# Patient Record
Sex: FEMALE | Race: WHITE | Employment: OTHER | URBAN - METROPOLITAN AREA
[De-identification: names, ages, dates, MRNs, and addresses within clinical notes are randomized per-mention and may not be internally consistent; named-entity substitution may affect disease eponyms.]

---

## 2017-01-19 ENCOUNTER — HOSPITAL ENCOUNTER (OUTPATIENT)
Dept: LAB | Age: 67
Discharge: HOME | End: 2017-01-19
Payer: MEDICARE

## 2017-01-19 LAB
BASOPHILS # BLD AUTO: 0 K/UL (ref 0–0.1)
BASOPHILS # BLD: 0 % (ref 0–2)
DIFFERENTIAL METHOD BLD: NORMAL
EOSINOPHIL # BLD: 0.1 K/UL (ref 0–0.4)
EOSINOPHIL NFR BLD: 2 % (ref 0–5)
ERYTHROCYTE [DISTWIDTH] IN BLOOD BY AUTOMATED COUNT: 13.5 % (ref 11.6–14.5)
FERRITIN SERPL-MCNC: 51 NG/ML (ref 8–388)
HCT VFR BLD AUTO: 41.5 % (ref 35–45)
HGB BLD-MCNC: 13.2 G/DL (ref 12–16)
IRON SATN MFR SERPL: 20 %
IRON SERPL-MCNC: 77 UG/DL (ref 50–175)
LYMPHOCYTES # BLD AUTO: 41 % (ref 21–52)
LYMPHOCYTES # BLD: 2.2 K/UL (ref 0.9–3.6)
MCH RBC QN AUTO: 25.7 PG (ref 24–34)
MCHC RBC AUTO-ENTMCNC: 31.8 G/DL (ref 31–37)
MCV RBC AUTO: 80.9 FL (ref 74–97)
MONOCYTES # BLD: 0.4 K/UL (ref 0.05–1.2)
MONOCYTES NFR BLD AUTO: 7 % (ref 3–10)
NEUTS SEG # BLD: 2.6 K/UL (ref 1.8–8)
NEUTS SEG NFR BLD AUTO: 50 % (ref 40–73)
PLATELET # BLD AUTO: 237 K/UL (ref 135–420)
PMV BLD AUTO: 11.1 FL (ref 9.2–11.8)
RBC # BLD AUTO: 5.13 M/UL (ref 4.2–5.3)
TIBC SERPL-MCNC: 382 UG/DL (ref 250–450)
WBC # BLD AUTO: 5.3 K/UL (ref 4.6–13.2)

## 2017-01-19 PROCEDURE — 36415 COLL VENOUS BLD VENIPUNCTURE: CPT | Performed by: INTERNAL MEDICINE

## 2017-01-19 PROCEDURE — 82728 ASSAY OF FERRITIN: CPT | Performed by: INTERNAL MEDICINE

## 2017-01-19 PROCEDURE — 83540 ASSAY OF IRON: CPT | Performed by: INTERNAL MEDICINE

## 2017-01-19 PROCEDURE — 85025 COMPLETE CBC W/AUTO DIFF WBC: CPT | Performed by: INTERNAL MEDICINE

## 2017-02-03 ENCOUNTER — OFFICE VISIT (OUTPATIENT)
Dept: INTERNAL MEDICINE CLINIC | Age: 67
End: 2017-02-03

## 2017-02-03 VITALS
WEIGHT: 121 LBS | BODY MASS INDEX: 24.39 KG/M2 | SYSTOLIC BLOOD PRESSURE: 120 MMHG | TEMPERATURE: 98.8 F | DIASTOLIC BLOOD PRESSURE: 80 MMHG | RESPIRATION RATE: 16 BRPM | OXYGEN SATURATION: 100 % | HEIGHT: 59 IN | HEART RATE: 105 BPM

## 2017-02-03 DIAGNOSIS — J45.20 MILD INTERMITTENT ASTHMA WITHOUT COMPLICATION: ICD-10-CM

## 2017-02-03 DIAGNOSIS — J06.9 URI, ACUTE: Primary | ICD-10-CM

## 2017-02-03 RX ORDER — AMOXICILLIN AND CLAVULANATE POTASSIUM 875; 125 MG/1; MG/1
TABLET, FILM COATED ORAL
Qty: 14 TAB | Refills: 0 | Status: SHIPPED | OUTPATIENT
Start: 2017-02-03 | End: 2017-02-24 | Stop reason: ALTCHOICE

## 2017-02-03 RX ORDER — BENZONATATE 200 MG/1
200 CAPSULE ORAL
Qty: 20 CAP | Refills: 0 | Status: SHIPPED | OUTPATIENT
Start: 2017-02-03 | End: 2017-02-10

## 2017-02-03 RX ORDER — AMOXICILLIN 500 MG/1
CAPSULE ORAL
Qty: 30 CAP | Refills: 0 | Status: SHIPPED | OUTPATIENT
Start: 2017-02-03 | End: 2017-02-04 | Stop reason: ALTCHOICE

## 2017-02-03 NOTE — PATIENT INSTRUCTIONS
Health Maintenance Due   Topic Date Due    Hepatitis C Test  1950    Breast Cancer Screening  08/01/2010    Shingles Vaccine  01/12/2011    Glaucoma Screening   01/12/2016    Bone Density Screening  01/12/2016    Pneumococcal Vaccine (1 of 2 - PCV13) 01/12/2016    Annual Well Visit  01/12/2016    DTaP/Tdap/Td  (1 - Tdap) 07/06/2016

## 2017-02-03 NOTE — PROGRESS NOTES
1. Have you been to the ER, urgent care clinic since your last visit? Hospitalized since your last visit? No    2. Have you seen or consulted any other health care providers outside of the [address] [place] [address] since your last visit? Include any pap smears or colon screening.  Dr. Hahn Delay - Colonoscopy scheduled for March - HV to do blood work

## 2017-02-04 NOTE — PROGRESS NOTES
The patient presents to the office today with the chief complaint of cough    HPI    The patient complains of sinus congestion with drainage, chest congestion and a cough. The symptoms started 5 days ago. The patient has mld asthma. The is some wheezing with this infection but not severe      Review of Systems   HENT: Positive for congestion. Respiratory: Positive for cough and shortness of breath (Very mild). Cardiovascular: Negative for chest pain and leg swelling. No Known Allergies    Current Outpatient Prescriptions   Medication Sig Dispense Refill    benzonatate (TESSALON) 200 mg capsule Take 1 Cap by mouth three (3) times daily as needed for Cough for up to 7 days. 20 Cap 0    amoxicillin-clavulanate (AUGMENTIN) 875-125 mg per tablet 1 tablet twice per day with food 14 Tab 0    fluticasone (FLONASE) 50 mcg/actuation nasal spray INSTILL 2 SPAYS INTO BOTH NOSTRILS NIGHTLY 1 Bottle 4    FLOVENT HFA 44 mcg/actuation inhaler INHALE 2 PUFFS BY MOUTH 2 TIMES DAILY 10 Inhaler 0    montelukast (SINGULAIR) 10 mg tablet TAKE 1 TABLET BY MOUTH DAILY. 90 Tab 1    meclizine (ANTIVERT) 12.5 mg tablet 1 tablet three times per day 40 Tab 1    levomilnacipran (FETZIMA) 20 mg capsule Take 20 mg by mouth daily.  DESIPRAMINE HCL (NORPRAMIN PO) Take 20 mg by mouth.  loratadine (CLARITIN) 10 mg tablet Take 10 mg by mouth daily.  escitalopram (LEXAPRO) 10 mg tablet Take 5 mg by mouth nightly.  clonazePAM (KLONOPIN) 0.5 mg tablet Take 0.5 mg by mouth two (2) times a day.          Past Medical History   Diagnosis Date    Acute bronchitis     Acute upper respiratory infection     Asthma     Cancer ([facility] [address]     Headache     Low back pain     Other ill-defined conditions(799.89)      cataracts    Psychiatric disorder      anxiety issues    Vision decreased      lens implants from cataracts       Past Surgical History   Procedure Laterality Date    Hx heent       cataract    Pr breast surgery procedure unlisted Bilateral 9/2008     mastectomy with reconstruction    Hx knee arthroscopy Bilateral     Hx tonsillectomy      Hx other surgical       sinus surgery    Hx colonoscopy  12/2012     neg       Social History     Social History    Marital status:      Spouse name: N/A    Number of children: N/A    Years of education: N/A     Occupational History    Not on file. Social History Main Topics    Smoking status: Never Smoker    Smokeless tobacco: Never Used    Alcohol use No    Drug use: No    Sexual activity: Not on file     Other Topics Concern    Not on file     Social History Narrative       Patient does not have an advanced directive on file    Visit Vitals    /80 (BP 1 Location: Left arm, BP Patient Position: Sitting)    Pulse (!) 105    Temp 98.8 °F (37.1 °C) (Tympanic)    Resp 16    Ht 4' 11\" (1.499 m)    Wt 121 lb (54.9 kg)    SpO2 100%    BMI 24.44 kg/m2       Physical Exam   HENT:   Mouth/Throat: No oropharyngeal exudate. Ears:  Mild erythema right TM. Normal on the left   Neck: Carotid bruit is not present. No thyromegaly present. Cardiovascular: Normal rate. Exam reveals no gallop. No murmur heard. Pulmonary/Chest: She has no wheezes. She has rales (Scattered rhonch). Abdominal: Soft. She exhibits no distension. There is no splenomegaly or hepatomegaly. There is no tenderness. Musculoskeletal: She exhibits no edema. Lymphadenopathy:     She has no cervical adenopathy. Right: No supraclavicular adenopathy present. Left: No supraclavicular adenopathy present.        Hospital Outpatient Visit on 01/19/2017   Component Date Value Ref Range Status    WBC 01/19/2017 5.3  4.6 - 13.2 K/uL Final    RBC 01/19/2017 5.13  4.20 - 5.30 M/uL Final    HGB 01/19/2017 13.2  12.0 - 16.0 g/dL Final    HCT 01/19/2017 41.5  35.0 - 45.0 % Final    MCV 01/19/2017 80.9  74.0 - 97.0 FL Final    MCH 01/19/2017 25.7  24.0 - 34.0 PG Final  MCHC 01/19/2017 31.8  31.0 - 37.0 g/dL Final    RDW 01/19/2017 13.5  11.6 - 14.5 % Final    PLATELET 74/72/2649 053  135 - 420 K/uL Final    MPV 01/19/2017 11.1  9.2 - 11.8 FL Final    NEUTROPHILS 01/19/2017 50  40 - 73 % Final    LYMPHOCYTES 01/19/2017 41  21 - 52 % Final    MONOCYTES 01/19/2017 7  3 - 10 % Final    EOSINOPHILS 01/19/2017 2  0 - 5 % Final    BASOPHILS 01/19/2017 0  0 - 2 % Final    ABS. NEUTROPHILS 01/19/2017 2.6  1.8 - 8.0 K/UL Final    ABS. LYMPHOCYTES 01/19/2017 2.2  0.9 - 3.6 K/UL Final    ABS. MONOCYTES 01/19/2017 0.4  0.05 - 1.2 K/UL Final    ABS. EOSINOPHILS 01/19/2017 0.1  0.0 - 0.4 K/UL Final    ABS. BASOPHILS 01/19/2017 0.0  0.0 - 0.1 K/UL Final    DF 01/19/2017 AUTOMATED    Final    Ferritin 01/19/2017 51  8 - 388 NG/ML Final    Iron 01/19/2017 77  50 - 175 ug/dL Final    TIBC 01/19/2017 382  250 - 450 ug/dL Final    Iron % saturation 01/19/2017 20  % Final       .No results found for any visits on 02/03/17. Assessment / Plan      ICD-10-CM ICD-9-CM    1. URI, acute J06.9 465.9    2. Mild intermittent asthma without complication D20.35 778.99      Augmentin  Tessalon Perles  she was advised to continue her maintenance medications  she is to call if symptoms persist over four days    Follow-up Disposition:  Return in about 4 months (around 6/3/2017). I asked Jaylyn Carey if she has any questions and I answered the questions.   Jaylyn Carey states that she understands the treatment plan and agrees with the treatment plan

## 2017-02-07 ENCOUNTER — OFFICE VISIT (OUTPATIENT)
Dept: INTERNAL MEDICINE CLINIC | Age: 67
End: 2017-02-07

## 2017-02-07 VITALS
TEMPERATURE: 98.7 F | BODY MASS INDEX: 24.39 KG/M2 | SYSTOLIC BLOOD PRESSURE: 130 MMHG | WEIGHT: 121 LBS | RESPIRATION RATE: 16 BRPM | HEART RATE: 75 BPM | OXYGEN SATURATION: 100 % | HEIGHT: 59 IN | DIASTOLIC BLOOD PRESSURE: 88 MMHG

## 2017-02-07 DIAGNOSIS — J40 BRONCHITIS: Primary | ICD-10-CM

## 2017-02-07 DIAGNOSIS — J45.21 MILD INTERMITTENT ASTHMA WITH ACUTE EXACERBATION: ICD-10-CM

## 2017-02-07 RX ORDER — MONTELUKAST SODIUM 10 MG/1
TABLET ORAL
Qty: 30 TAB | Refills: 5 | Status: SHIPPED | OUTPATIENT
Start: 2017-02-07 | End: 2017-04-07 | Stop reason: SDUPTHER

## 2017-02-07 RX ORDER — PREDNISONE 10 MG/1
TABLET ORAL
Qty: 20 TAB | Refills: 0 | Status: SHIPPED | OUTPATIENT
Start: 2017-02-07 | End: 2017-02-24 | Stop reason: ALTCHOICE

## 2017-02-07 RX ORDER — CLARITHROMYCIN 500 MG/1
TABLET, FILM COATED ORAL
Qty: 20 TAB | Refills: 0 | Status: SHIPPED | OUTPATIENT
Start: 2017-02-07 | End: 2017-02-17 | Stop reason: SDUPTHER

## 2017-02-07 NOTE — PROGRESS NOTES
1. Have you been to the ER, urgent care clinic since your last visit? Hospitalized since your last visit? No    2. Have you seen or consulted any other health care providers outside of the [address] [place] [address] since your last visit? Include any pap smears or colon screening.  No

## 2017-02-07 NOTE — PROGRESS NOTES
The patient presents to the office today with the chief complaint of chest congestion    HPI    The patient complains of chest congestion with cough. The cough is minimally productive of yellowish sputum. The patient denies fever. The patient has mild dyspnea. The patient recently had been on antibiotics with little improvement. Review of Systems   Respiratory: Positive for sputum production (Small amount of sputum) and shortness of breath (Mild dyspnea). Cardiovascular: Negative for chest pain and leg swelling. No Known Allergies    Current Outpatient Prescriptions   Medication Sig Dispense Refill    clarithromycin (BIAXIN) 500 mg tablet 1 tablet twice per day with food (Stop Augmentin) 20 Tab 0    predniSONE (DELTASONE) 10 mg tablet 3 tabs daily x 3 days, 2 tabs daily x 3 days, 1 tab daily x 3 days, 1/2 tab daily x 3 days 20 Tab 0    montelukast (SINGULAIR) 10 mg tablet 1 tablet daily 30 Tab 5    benzonatate (TESSALON) 200 mg capsule Take 1 Cap by mouth three (3) times daily as needed for Cough for up to 7 days. 20 Cap 0    amoxicillin-clavulanate (AUGMENTIN) 875-125 mg per tablet 1 tablet twice per day with food 14 Tab 0    fluticasone (FLONASE) 50 mcg/actuation nasal spray INSTILL 2 SPAYS INTO BOTH NOSTRILS NIGHTLY 1 Bottle 4    FLOVENT HFA 44 mcg/actuation inhaler INHALE 2 PUFFS BY MOUTH 2 TIMES DAILY 10 Inhaler 0    meclizine (ANTIVERT) 12.5 mg tablet 1 tablet three times per day 40 Tab 1    levomilnacipran (FETZIMA) 20 mg capsule Take 20 mg by mouth daily.  DESIPRAMINE HCL (NORPRAMIN PO) Take 20 mg by mouth.  loratadine (CLARITIN) 10 mg tablet Take 10 mg by mouth daily.  escitalopram (LEXAPRO) 10 mg tablet Take 5 mg by mouth nightly.  clonazePAM (KLONOPIN) 0.5 mg tablet Take 0.5 mg by mouth two (2) times a day.          Past Medical History   Diagnosis Date    Acute bronchitis     Acute upper respiratory infection     Asthma     Cancer ([facility] [address]     Headache     Low back pain     Other ill-defined conditions(799.89)      cataracts    Psychiatric disorder      anxiety issues    Vision decreased      lens implants from cataracts       Past Surgical History   Procedure Laterality Date    Hx heent       cataract    Pr breast surgery procedure unlisted Bilateral 9/2008     mastectomy with reconstruction    Hx knee arthroscopy Bilateral     Hx tonsillectomy      Hx other surgical       sinus surgery    Hx colonoscopy  12/2012     neg       Social History     Social History    Marital status:      Spouse name: N/A    Number of children: N/A    Years of education: N/A     Occupational History    Not on file. Social History Main Topics    Smoking status: Never Smoker    Smokeless tobacco: Never Used    Alcohol use No    Drug use: No    Sexual activity: Not Currently     Other Topics Concern    Not on file     Social History Narrative       Patient does not have an advanced directive on file    Visit Vitals    /88 (BP 1 Location: Left arm, BP Patient Position: Sitting)    Pulse 75    Temp 98.7 °F (37.1 °C) (Tympanic)    Resp 16    Ht 4' 11\" (1.499 m)    Wt 121 lb (54.9 kg)    SpO2 100%    BMI 24.44 kg/m2       Physical Exam   HENT:   Mouth/Throat: No oropharyngeal exudate. Neck: Carotid bruit is not present. Cardiovascular: Normal rate and regular rhythm. Exam reveals no gallop. No murmur heard. Pulmonary/Chest: She has no wheezes. She has rales (Scattered rhonchi with prolonged expiratory phase).      BMI:  [facility] Outpatient Visit on 01/19/2017   Component Date Value Ref Range Status    WBC 01/19/2017 5.3  4.6 - 13.2 K/uL Final    RBC 01/19/2017 5.13  4.20 - 5.30 M/uL Final    HGB 01/19/2017 13.2  12.0 - 16.0 g/dL Final    HCT 01/19/2017 41.5  35.0 - 45.0 % Final    MCV 01/19/2017 80.9  74.0 - 97.0 FL Final    MCH 01/19/2017 25.7  24.0 - 34.0 PG Final    MCHC 01/19/2017 31.8  31.0 - 37.0 g/dL Final    RDW 01/19/2017 13.5  11.6 - 14.5 % Final    PLATELET 00/02/3927 803  135 - 420 K/uL Final    MPV 01/19/2017 11.1  9.2 - 11.8 FL Final    NEUTROPHILS 01/19/2017 50  40 - 73 % Final    LYMPHOCYTES 01/19/2017 41  21 - 52 % Final    MONOCYTES 01/19/2017 7  3 - 10 % Final    EOSINOPHILS 01/19/2017 2  0 - 5 % Final    BASOPHILS 01/19/2017 0  0 - 2 % Final    ABS. NEUTROPHILS 01/19/2017 2.6  1.8 - 8.0 K/UL Final    ABS. LYMPHOCYTES 01/19/2017 2.2  0.9 - 3.6 K/UL Final    ABS. MONOCYTES 01/19/2017 0.4  0.05 - 1.2 K/UL Final    ABS. EOSINOPHILS 01/19/2017 0.1  0.0 - 0.4 K/UL Final    ABS. BASOPHILS 01/19/2017 0.0  0.0 - 0.1 K/UL Final    DF 01/19/2017 AUTOMATED    Final    Ferritin 01/19/2017 51  8 - 388 NG/ML Final    Iron 01/19/2017 77  50 - 175 ug/dL Final    TIBC 01/19/2017 382  250 - 450 ug/dL Final    Iron % saturation 01/19/2017 20  % Final       .No results found for any visits on 02/07/17. Assessment / Plan      ICD-10-CM ICD-9-CM    1. Bronchitis J40 490    2. Mild intermittent asthma with acute exacerbation J45.21 493.92      Biaxin  Prednisone  she was advised to continue her maintenance medications  she is to call if symptoms persist over five days    Follow-up Disposition:  Return in about 4 months (around 6/7/2017). I asked Radha Santana if she has any questions and I answered the questions.   Radha Santana states that she understands the treatment plan and agrees with the treatment plan

## 2017-02-17 RX ORDER — CLARITHROMYCIN 500 MG/1
TABLET, FILM COATED ORAL
Qty: 20 TAB | Refills: 0 | Status: SHIPPED | OUTPATIENT
Start: 2017-02-17 | End: 2017-05-08 | Stop reason: ALTCHOICE

## 2017-02-17 NOTE — TELEPHONE ENCOUNTER
Patient states the Biaxin Dr. Ivana Caro gave her has helped a lot, she took her last one this morning. She feels like she needs another prescription to help her get completely well. She is still taking the prednisone, tomorrow will be the last day of prednisone.

## 2017-02-24 ENCOUNTER — OFFICE VISIT (OUTPATIENT)
Dept: INTERNAL MEDICINE CLINIC | Age: 67
End: 2017-02-24

## 2017-02-24 VITALS
RESPIRATION RATE: 16 BRPM | OXYGEN SATURATION: 98 % | BODY MASS INDEX: 24.39 KG/M2 | WEIGHT: 121 LBS | TEMPERATURE: 99.9 F | SYSTOLIC BLOOD PRESSURE: 128 MMHG | HEART RATE: 100 BPM | DIASTOLIC BLOOD PRESSURE: 82 MMHG | HEIGHT: 59 IN

## 2017-02-24 DIAGNOSIS — J06.9 ACUTE URI: Primary | ICD-10-CM

## 2017-02-24 DIAGNOSIS — R50.9 FEVER, UNSPECIFIED FEVER CAUSE: ICD-10-CM

## 2017-02-24 LAB
QUICKVUE INFLUENZA TEST: NEGATIVE
QUICKVUE INFLUENZA TEST: NEGATIVE
VALID INTERNAL CONTROL?: YES
VALID INTERNAL CONTROL?: YES

## 2017-02-24 RX ORDER — CEFUROXIME AXETIL 500 MG/1
TABLET ORAL
Qty: 14 TAB | Refills: 0 | Status: SHIPPED | OUTPATIENT
Start: 2017-02-24 | End: 2017-05-08 | Stop reason: ALTCHOICE

## 2017-02-24 NOTE — PATIENT INSTRUCTIONS
Health Maintenance Due   Topic    Hepatitis C Screening     BREAST CANCER SCRN MAMMOGRAM     ZOSTER VACCINE AGE 60>     GLAUCOMA SCREENING Q2Y     OSTEOPOROSIS SCREENING (DEXA)     Pneumococcal 65+ Low/Medium Risk (1 of 2 - PCV13)    MEDICARE YEARLY EXAM     DTaP/Tdap/Td series (1 - Tdap)

## 2017-02-24 NOTE — PROGRESS NOTES
1. Have you been to the ER, urgent care clinic since your last visit? Hospitalized since your last visit? No    2. Have you seen or consulted any other health care providers outside of the [address] [place] [address] since your last visit? Include any pap smears or colon screening.  No

## 2017-02-24 NOTE — MR AVS SNAPSHOT
Visit Information Date & Time Provider Department Dept. Phone Encounter #  
 2/24/2017 10:00 AM Maria Isabel Avelar MD [facility] [place] [phone] [id number] Follow-up Instructions Return in about 3 months (around 5/24/2017). Your Appointments 4/13/2017  2:30 PM  
Follow Up with Maria Isabel Avelar MD  
[facility] Appt Note: 6wk  
 [address] [facility], [address] [place] [address]  
  
   
 [address] [facility], [address] [place] [address] Upcoming Health Maintenance Date Due Hepatitis C Screening 1950 BREAST CANCER SCRN MAMMOGRAM 8/1/2010 ZOSTER VACCINE AGE 60> 1/12/2011 GLAUCOMA SCREENING Q2Y 1/12/2016 OSTEOPOROSIS SCREENING (DEXA) 1/12/2016 Pneumococcal 65+ Low/Medium Risk (1 of 2 - PCV13) 1/12/2016 MEDICARE YEARLY EXAM 1/12/2016 DTaP/Tdap/Td series (1 - Tdap) 7/6/2016 COLONOSCOPY 12/14/2022 Allergies as of 2/24/2017  Review Complete On: 2/24/2017 By: Maria Isabel Avelar MD  
 No Known Allergies Current Immunizations  Never Reviewed Name Date Influenza High Dose Vaccine PF 9/30/2016 Influenza Vaccine (Quad) PF 12/2/2015  2:46 PM  
 Td, Adsorbed PF 7/5/2016 Not reviewed this visit You Were Diagnosed With   
  
 Codes Comments Acute URI    -  Primary ICD-10-CM: J06.9 ICD-9-CM: 465.9 Vitals BP  
  
  
  
  
  
 128/82 (BP 1 Location: Left arm) Vitals History BMI and BSA Data Body Mass Index Body Surface Area  
 24.44 kg/m 2 1.51 m 2 Preferred Pharmacy Pharmacy Name Phone [address] [facility] [address] [facility] [phone] Your Updated Medication List  
  
   
This list is accurate as of: 2/24/17 11:42 AM.  Always use your most recent med list.  
  
  
  
  
 cefUROXime 500 mg tablet Commonly known as:  CEFTIN  
 1 tablet twice per day  
  
 clarithromycin 500 mg tablet Commonly known as:  BIAXIN  
1 tablet twice per day with food (Stop Augmentin) CLARITIN 10 mg tablet Generic drug:  loratadine Take 10 mg by mouth daily. FETZIMA 20 mg capsule Generic drug:  levomilnacipran Take 20 mg by mouth daily. * FLOVENT HFA 44 mcg/actuation inhaler Generic drug:  fluticasone INHALE 2 PUFFS BY MOUTH 2 TIMES DAILY * fluticasone 50 mcg/actuation nasal spray Commonly known as:  Vamsi Redo INSTILL 2 SPAYS INTO BOTH NOSTRILS NIGHTLY KlonoPIN 0.5 mg tablet Generic drug:  clonazePAM  
Take 0.5 mg by mouth two (2) times a day. LEXAPRO 10 mg tablet Generic drug:  escitalopram oxalate Take 5 mg by mouth nightly. meclizine 12.5 mg tablet Commonly known as:  ANTIVERT  
1 tablet three times per day  
  
 montelukast 10 mg tablet Commonly known as:  SINGULAIR  
1 tablet daily NORPRAMIN PO Take 20 mg by mouth. * Notice: This list has 2 medication(s) that are the same as other medications prescribed for you. Read the directions carefully, and ask your doctor or other care provider to review them with you. Prescriptions Sent to Pharmacy Refills  
 cefUROXime (CEFTIN) 500 mg tablet 0 Si tablet twice per day Class: Normal  
 Pharmacy: [address] [place] [address] #: [phone] Follow-up Instructions Return in about 3 months (around 2017). Patient Instructions Health Maintenance Due Topic  Hepatitis C Screening  BREAST CANCER SCRN MAMMOGRAM   
 ZOSTER VACCINE AGE 60>   
 GLAUCOMA SCREENING Q2Y   
 OSTEOPOROSIS SCREENING (DEXA)  Pneumococcal 65+ Low/Medium Risk (1 of 2 - PCV13)  MEDICARE YEARLY EXAM   
 DTaP/Tdap/Td series (1 - Tdap) Introducing [facility]!    
 Fariha Chakraborty introduces [employer] patient portal. Now you can access parts of your medical record, email your doctor's office, and request medication refills online. 1. In your internet browser, go to [email or web address] 2. Click on the First Time User? Click Here link in the Sign In box. You will see the New Member Sign Up page. 3. Enter your [employer] Access Code exactly as it appears below. You will not need to use this code after youve completed the sign-up process. If you do not sign up before the expiration date, you must request a new code. · [employer] Access Code: [email or web address] Expires: 4/19/2017  1:20 PM 
 
4. Enter the last four digits of your Social Security Number (xxxx) and Date of Birth (mm/dd/yyyy) as indicated and click Submit. You will be taken to the next sign-up page. 5. Create a Divide ID. This will be your [employer] login ID and cannot be changed, so think of one that is secure and easy to remember. 6. Create a [employer] password. You can change your password at any time. 7. Enter your Password Reset Question and Answer. This can be used at a later time if you forget your password. 8. Enter your e-mail address. You will receive e-mail notification when new information is available in [address] 9. Click Sign Up. You can now view and download portions of your medical record. 10. Click the Download Summary menu link to download a portable copy of your medical information. If you have questions, please visit the Frequently Asked Questions section of the [employer] website. Remember, [employer] is NOT to be used for urgent needs. For medical emergencies, dial 911. Now available from your iPhone and Android! Please provide this summary of care documentation to your next provider. Your primary care clinician is listed as Cristian Abraham. If you have any questions after today's visit, please call [phone].

## 2017-02-24 NOTE — PROGRESS NOTES
The patient presents to the office today with the chief complaint of Sinus Congestion    HPI    The patient complains of sinus congestion with drainage and a cough. she notes a low grade fever which is persistent. Review of Systems   HENT: Positive for congestion. Respiratory: Positive for cough. Negative for shortness of breath. Cardiovascular: Negative for chest pain and leg swelling. No Known Allergies    Current Outpatient Prescriptions   Medication Sig Dispense Refill    cefUROXime (CEFTIN) 500 mg tablet 1 tablet twice per day 14 Tab 0    clarithromycin (BIAXIN) 500 mg tablet 1 tablet twice per day with food (Stop Augmentin) 20 Tab 0    montelukast (SINGULAIR) 10 mg tablet 1 tablet daily 30 Tab 5    fluticasone (FLONASE) 50 mcg/actuation nasal spray INSTILL 2 SPAYS INTO BOTH NOSTRILS NIGHTLY 1 Bottle 4    meclizine (ANTIVERT) 12.5 mg tablet 1 tablet three times per day 40 Tab 1    levomilnacipran (FETZIMA) 20 mg capsule Take 20 mg by mouth daily.  DESIPRAMINE HCL (NORPRAMIN PO) Take 20 mg by mouth.  loratadine (CLARITIN) 10 mg tablet Take 10 mg by mouth daily.  escitalopram (LEXAPRO) 10 mg tablet Take 5 mg by mouth nightly.  clonazePAM (KLONOPIN) 0.5 mg tablet Take 0.5 mg by mouth two (2) times a day.          Past Medical History:   Diagnosis Date    Acute bronchitis     Acute upper respiratory infection     Asthma     Cancer ([facility] [address]     Headache     Low back pain     Other ill-defined conditions(799.89)     cataracts    Psychiatric disorder     anxiety issues    Vision decreased     lens implants from cataracts       Past Surgical History:   Procedure Laterality Date    BREAST SURGERY PROCEDURE UNLISTED Bilateral 9/2008    mastectomy with reconstruction    HX COLONOSCOPY  12/2012    neg    HX HEENT      cataract    HX KNEE ARTHROSCOPY Bilateral     HX OTHER SURGICAL      sinus surgery    HX TONSILLECTOMY         Social History     Social History    Marital status:      Spouse name: N/A    Number of children: N/A    Years of education: N/A     Occupational History    Not on file. Social History Main Topics    Smoking status: Never Smoker    Smokeless tobacco: Never Used    Alcohol use No    Drug use: No    Sexual activity: Not Currently     Other Topics Concern    Not on file     Social History Narrative       Patient does not have an advanced directive on file    Visit Vitals    /82 (BP 1 Location: Left arm)    Pulse 100    Temp 99.9 °F (37.7 °C)    Resp 16    Ht 4' 11\" (1.499 m)    Wt 121 lb (54.9 kg)    SpO2 98%    BMI 24.44 kg/m2       Physical Exam   HENT:   Mouth/Throat: No oropharyngeal exudate. Neck: Carotid bruit is not present. No thyromegaly present. Cardiovascular: Normal rate and regular rhythm. Exam reveals no gallop. No murmur heard. Pulmonary/Chest: She has no wheezes. She has no rales. Abdominal: Soft. Bowel sounds are normal. She exhibits no distension and no mass. There is no tenderness. Musculoskeletal: She exhibits no edema. Lymphadenopathy:     She has no cervical adenopathy.        BMI:  OK    Office Visit on 02/24/2017   Component Date Value Ref Range Status    VALID INTERNAL CONTROL POC 02/24/2017 Yes   Final    QuickVue Influenza test 02/24/2017 Negative  Negative Final    influenza B negative    VALID INTERNAL CONTROL POC 02/24/2017 Yes   Preliminary    QuickVue Influenza test 02/24/2017 Negative  Negative Preliminary    influenza A negative   Hospital Outpatient Visit on 01/19/2017   Component Date Value Ref Range Status    WBC 01/19/2017 5.3  4.6 - 13.2 K/uL Final    RBC 01/19/2017 5.13  4.20 - 5.30 M/uL Final    HGB 01/19/2017 13.2  12.0 - 16.0 g/dL Final    HCT 01/19/2017 41.5  35.0 - 45.0 % Final    MCV 01/19/2017 80.9  74.0 - 97.0 FL Final    MCH 01/19/2017 25.7  24.0 - 34.0 PG Final    MCHC 01/19/2017 31.8  31.0 - 37.0 g/dL Final    RDW 01/19/2017 13.5  11.6 - 14.5 % Final    PLATELET 76/72/2872 161  135 - 420 K/uL Final    MPV 01/19/2017 11.1  9.2 - 11.8 FL Final    NEUTROPHILS 01/19/2017 50  40 - 73 % Final    LYMPHOCYTES 01/19/2017 41  21 - 52 % Final    MONOCYTES 01/19/2017 7  3 - 10 % Final    EOSINOPHILS 01/19/2017 2  0 - 5 % Final    BASOPHILS 01/19/2017 0  0 - 2 % Final    ABS. NEUTROPHILS 01/19/2017 2.6  1.8 - 8.0 K/UL Final    ABS. LYMPHOCYTES 01/19/2017 2.2  0.9 - 3.6 K/UL Final    ABS. MONOCYTES 01/19/2017 0.4  0.05 - 1.2 K/UL Final    ABS. EOSINOPHILS 01/19/2017 0.1  0.0 - 0.4 K/UL Final    ABS. BASOPHILS 01/19/2017 0.0  0.0 - 0.1 K/UL Final    DF 01/19/2017 AUTOMATED    Final    Ferritin 01/19/2017 51  8 - 388 NG/ML Final    Iron 01/19/2017 77  50 - 175 ug/dL Final    TIBC 01/19/2017 382  250 - 450 ug/dL Final    Iron % saturation 01/19/2017 20  % Final       .  Results for orders placed or performed in visit on 02/24/17   AMB POC RAPID INFLUENZA TEST   Result Value Ref Range    VALID INTERNAL CONTROL POC Yes     QuickVue Influenza test Negative Negative   AMB POC RAPID INFLUENZA TEST   Result Value Ref Range    VALID INTERNAL CONTROL POC Yes     QuickVue Influenza test Negative Negative       Assessment / Plan      ICD-10-CM ICD-9-CM    1. Acute URI J06.9 465.9 AMB POC RAPID INFLUENZA TEST      AMB POC RAPID INFLUENZA TEST   2. Fever, unspecified fever cause R50.9 780.60 AMB POC RAPID INFLUENZA TEST      AMB POC RAPID INFLUENZA TEST     Continued URI despite previous therapies  Ceftin    Follow-up Disposition:  Return in about 3 months (around 5/24/2017). I asked Lolis Mir if she has any questions and I answered the questions.   Lolis Mir states that she understands the treatment plan and agrees with the treatment plan

## 2017-04-07 RX ORDER — MONTELUKAST SODIUM 10 MG/1
TABLET ORAL
Qty: 90 TAB | Refills: 3 | Status: SHIPPED | OUTPATIENT
Start: 2017-04-07 | End: 2017-04-11 | Stop reason: SDUPTHER

## 2017-04-11 RX ORDER — MONTELUKAST SODIUM 10 MG/1
TABLET ORAL
Qty: 90 TAB | Refills: 3 | Status: SHIPPED | OUTPATIENT
Start: 2017-04-11 | End: 2019-01-03 | Stop reason: SDUPTHER

## 2017-04-11 NOTE — TELEPHONE ENCOUNTER
Requested Prescriptions     Pending Prescriptions Disp Refills    montelukast (SINGULAIR) 10 mg tablet 90 Tab 3     Si tablet daily          PATIENT IS OUT

## 2017-04-12 RX ORDER — FLUTICASONE PROPIONATE 50 MCG
SPRAY, SUSPENSION (ML) NASAL
Qty: 1 BOTTLE | Refills: 4 | Status: SHIPPED | OUTPATIENT
Start: 2017-04-12 | End: 2017-09-05 | Stop reason: SDUPTHER

## 2017-05-08 ENCOUNTER — OFFICE VISIT (OUTPATIENT)
Dept: INTERNAL MEDICINE CLINIC | Age: 67
End: 2017-05-08

## 2017-05-08 VITALS
HEIGHT: 59 IN | RESPIRATION RATE: 16 BRPM | WEIGHT: 117 LBS | BODY MASS INDEX: 23.59 KG/M2 | TEMPERATURE: 98.8 F | DIASTOLIC BLOOD PRESSURE: 80 MMHG | HEART RATE: 96 BPM | SYSTOLIC BLOOD PRESSURE: 118 MMHG | OXYGEN SATURATION: 97 %

## 2017-05-08 DIAGNOSIS — Z85.3 HX OF BREAST CANCER: Primary | ICD-10-CM

## 2017-05-08 DIAGNOSIS — L30.9 ECZEMA, UNSPECIFIED TYPE: ICD-10-CM

## 2017-05-08 DIAGNOSIS — F33.0 MILD EPISODE OF RECURRENT MAJOR DEPRESSIVE DISORDER (HCC): ICD-10-CM

## 2017-05-08 RX ORDER — HYDROCORTISONE BUTYRATE 1 MG/G
CREAM TOPICAL
Qty: 4 OZ | Refills: 1 | Status: SHIPPED | OUTPATIENT
Start: 2017-05-08 | End: 2018-03-03 | Stop reason: ALTCHOICE

## 2017-05-08 NOTE — PROGRESS NOTES
The patient presents to the office today with the chief complaint of skin deformity from previous surgery and for a pre op evaluation    HPI    Joann Sin complains of problems for scars from a previous double mastectomy. she is now scheduled for surgical correction. Other than the problems from the previous surgery the patient has problems with eczema over her upper chest.  The patient denies chest pain or dyspnea. The patient has asthma but this has been stable    Review of Systems   Respiratory: Negative for shortness of breath. Cardiovascular: Negative for chest pain and leg swelling. Skin:        As per HPI       No Known Allergies    Current Outpatient Prescriptions   Medication Sig Dispense Refill    hydrocortisone butyr-emollient 0.1 % topical cream Apply twice per day as needed 4 oz 1    fluticasone (FLONASE) 50 mcg/actuation nasal spray INSTILL 2 SPAYS INTO BOTH NOSTRILS NIGHTLY 1 Bottle 4    montelukast (SINGULAIR) 10 mg tablet 1 tablet daily 90 Tab 3    meclizine (ANTIVERT) 12.5 mg tablet 1 tablet three times per day 40 Tab 1    levomilnacipran (FETZIMA) 20 mg capsule Take 20 mg by mouth daily.  DESIPRAMINE HCL (NORPRAMIN PO) Take 20 mg by mouth.  loratadine (CLARITIN) 10 mg tablet Take 10 mg by mouth daily.  escitalopram (LEXAPRO) 10 mg tablet Take 5 mg by mouth nightly.  clonazePAM (KLONOPIN) 0.5 mg tablet Take 0.5 mg by mouth two (2) times a day.          Past Medical History:   Diagnosis Date    Acute bronchitis     Acute upper respiratory infection     Asthma     Cancer ([facility] [address]     Headache     Low back pain     Other ill-defined conditions     cataracts    Psychiatric disorder     anxiety issues    Vision decreased     lens implants from cataracts       Past Surgical History:   Procedure Laterality Date    BREAST SURGERY PROCEDURE UNLISTED Bilateral 9/2008    mastectomy with reconstruction    HX COLONOSCOPY  12/2012    neg    HX HEENT      cataract  HX KNEE ARTHROSCOPY Bilateral     HX OTHER SURGICAL      sinus surgery    HX TONSILLECTOMY         Social History     Social History    Marital status:      Spouse name: N/A    Number of children: N/A    Years of education: N/A     Occupational History    Not on file. Social History Main Topics    Smoking status: Never Smoker    Smokeless tobacco: Never Used    Alcohol use No    Drug use: No    Sexual activity: Not Currently     Other Topics Concern    Not on file     Social History Narrative       Patient does not have an advanced directive on file    Visit Vitals    /80 (BP 1 Location: Left arm, BP Patient Position: Sitting)    Pulse 96    Temp 98.8 °F (37.1 °C) (Tympanic)    Resp 16    Ht 4' 11\" (1.499 m)    Wt 117 lb (53.1 kg)    SpO2 97%    BMI 23.63 kg/m2       Physical Exam   Skin with keloids over her chest with contracted skin over her chest.  Patches of dry skin over her upper chest  No Cervical Lymphadenopathy  No Supraclavicular Lymphadenopathy  Thyroid is Normal  Lungs are clear to ausculation and percussion  Heart:  S1 S2 are normal, No gallops, No mummers  No Carotid Bruits  Abdomen:  Normal Bowel Sounds. No tenderness. No masses. No Hepatomegaly or Splenomegly  LE:  Strong Pedal Pulses. No Edema    BMI:  OK    Office Visit on 02/24/2017   Component Date Value Ref Range Status    VALID INTERNAL CONTROL POC 02/24/2017 Yes   Final    QuickVue Influenza test 02/24/2017 Negative  Negative Final    influenza B negative    VALID INTERNAL CONTROL POC 02/24/2017 Yes   Preliminary    [facility]Vue Influenza test 02/24/2017 Negative  Negative Preliminary    influenza A negative       . No results found for any visits on 05/08/17. Pre op testing:  pending    Assessment / Plan      ICD-10-CM ICD-9-CM    1. Hx of breast cancer Z85.3 V10.3    2. Mild episode of recurrent major depressive disorder (HCC) F33.0 296.31    3.  Eczema, unspecified type L30.9 692.9      Hx of breast cancer -- there is no evidence of reoccurrence. Scarring from surgery  Depression -- stable  Eczema   BASED ON H & P THE PATIENT IS OK FOR SURGERY. FINAL CLEARANCE AWAITS PREOP TEST RESULTS    Hydrocortisone lotion to upper chest  she was advised to continue her maintenance medications      Follow-up Disposition:  Return in about 5 months (around 10/8/2017). I asked Saranya Trevino if she has any questions and I answered the questions. Saranya Trevino states that she understands the treatment plan and agrees with the treatment plan      ADDENDUM (5/22/17):  Preop EKG is abnormal.  Will hold off surgery and proceed with a cardia work up.

## 2017-05-08 NOTE — PATIENT INSTRUCTIONS
Medicare Wellness Visit, Female    The best way to improve and maintain good health is to have a healthy lifestyle by eating a well-balanced diet, exercising regularly, limiting alcohol and stopping smoking. Regular visits with your physician or non-physician health care provider also support your good health. Preventive screening tests can find health problems before they become diseases or illnesses. Preventive services such as immunizations prevent serious infections. All people over age 72 should have a Pneumovax and a Prevnar-13 shot to prevent potentially life threatening infections with the pneumococcus bacteria, a common cause of pneumonia. These are once in a lifetime unless you and your provider decide differently. All people over 65 should have a yearly influenza vaccine or \"flu\" shot. This does not prevent infection with cold viruses but has been proven to prevent hospitalization and death from influenza. Although Medicare part B \"regular Medicare\" currently only covers tetanus vaccination in the context of an injury, a tetanus vaccine (Tdap or Td) is recommended every 10 years. A shingles vaccine is recommended once in a lifetime after age 61. The Shingles vaccine is also not covered by Medicare part B. Note, however, that both the Shingles vaccine and Tdap/Td are generally covered by secondary carriers. Please check your coverage and out of pocket expenses. Consider contacting your local health department because it may stock these vaccines for a reasonable charge. We currently have documentation of the following immunization history for you:  Immunization History   Administered Date(s) Administered    Influenza High Dose Vaccine PF 09/30/2016    Influenza Vaccine (Quad) PF 12/02/2015    Td, Adsorbed PF 07/05/2016       Screening for infection with Hepatitis C is recommended for anyone born between [birth year] through 1965. The table at the bottom of this document indicates the status of this and other preventive services. A bone mass density test (DEXA) to screen for osteoporosis or thinning of the bones should be done at least once after age 72 and may be done up to every 2 years as determined by you and your health care provider. The most recent DEXA we have on file for you is:      Screening for diabetes mellitus with a blood sugar test (glucose) should be done at least every 3 years until age 79. You and your health care provider may decide whether to continue screening after age 79. The most recent blood glucose we have on file for you is:   Lab Results   Component Value Date/Time    Glucose 80 09/30/2016 10:43 AM         Glaucoma is a disease of the eye due to increased ocular pressure that can lead to blindness. People with risk factors for glaucoma ( race, diabetes, family history) should be screened at least every 2 years by an eye professional.     Cardiovascular screening tests that check for elevated lipids or cholesterol (fatty part of blood) which can lead to heart disease and strokes should be done every 4-6 years through age 79. You and your health care provider may decide whether to continue screening after age 79. The most recent lipid panel we have on file for you is:   No results found for: CHOL, CHOLPOCT, CHOLX, CHLST, CHOLV, HDL, HDLPOC, LDL, LDLCPOC, NLDLCT, DLDL, LDLC, DLDLP, VLDLC, VLDL, TGL, TGLX, TRIGL, [id number], TRIGP, TGLPOCT, CHHD, CHHDX    Colorectal cancer screening that evaluates for blood or polyps in your colon for people with average risk should be done yearly as a stool test, every five years as a flexible sigmoidoscope or every 10 years as a colonoscopy up to age 76. You and your health care provider may decide whether to continue screening after age 76. Breast cancer screening with a mammogram is recommended at least once every 2 years  for women age 54-69.  You and your health care provider may decide whether to continue screening after age 76. The most recent mammogram we have on file for you is:   ROHIT Results (most recent):  No results found for this or any previous visit. Screening for cervical cancer with a pap smear is recommended for all women with a cervix until age 72. The frequency of this test is based on the details of her prior pap smear testing. You and your health care provider may decide whether to continue screening after age 72. If you have been a smoker or had family history of abdominal aortic aneurysms, you and your provider may decide to schedule an ultrasound test of your aorta. If you have questions regarding this please ask your health care provider    People ages 54 to [de-identified] years who have smoked the equivalent of 1 pack per day for 30 years or more may benefit from screening for lung cancer with a yearly low dose CT scan until they have been non smokers for 15 years. Please ask your health care provider if you have any questions    Your Medicare Wellness Exam is recommended annually.

## 2017-05-08 NOTE — PROGRESS NOTES
1. Have you been to the ER, urgent care clinic since your last visit? Hospitalized since your last visit? No    2. Have you seen or consulted any other health care providers outside of the [address] [place] [address] since your last visit? Include any pap smears or colon screening.  No

## 2017-05-08 NOTE — MR AVS SNAPSHOT
Visit Information Date & Time Provider Department Dept. Phone Encounter #  
 5/8/2017  9:15 AM Angie Fuller MD [facility] [phone] [id number] Upcoming Health Maintenance Date Due Hepatitis C Screening 1950 BREAST CANCER SCRN MAMMOGRAM 8/1/2010 ZOSTER VACCINE AGE 60> 1/12/2011 OSTEOPOROSIS SCREENING (DEXA) 1/12/2016 Pneumococcal 65+ Low/Medium Risk (1 of 2 - PCV13) 1/12/2016 MEDICARE YEARLY EXAM 1/12/2016 DTaP/Tdap/Td series (1 - Tdap) 7/6/2016 INFLUENZA AGE 9 TO ADULT 8/1/2017 GLAUCOMA SCREENING Q2Y 11/23/2018 COLONOSCOPY 5/1/2027 Allergies as of 5/8/2017  Review Complete On: 5/8/2017 By: Jose C Li LPN No Known Allergies Current Immunizations  Never Reviewed Name Date Influenza High Dose Vaccine PF 9/30/2016 Influenza Vaccine (Quad) PF 12/2/2015  2:46 PM  
 Td, Adsorbed PF 7/5/2016 Not reviewed this visit Vitals BP Pulse Temp Resp Height(growth percentile) 118/80 (BP 1 Location: Left arm, BP Patient Position: Sitting) 96 98.8 °F (37.1 °C) (Tympanic) 16 4' 11\" (1.499 m) Weight(growth percentile) SpO2 BMI OB Status Smoking Status 117 lb (53.1 kg) 97% 23.63 kg/m2 Postmenopausal Never Smoker BMI and BSA Data Body Mass Index Body Surface Area  
 23.63 kg/m 2 1.49 m 2 Preferred Pharmacy Pharmacy Name Phone [address], [address] [place] [address] [phone] [facility] [phone] Your Updated Medication List  
  
   
This list is accurate as of: 5/8/17 10:35 AM.  Always use your most recent med list.  
  
  
  
  
 CLARITIN 10 mg tablet Generic drug:  loratadine Take 10 mg by mouth daily. FETZIMA 20 mg capsule Generic drug:  levomilnacipran Take 20 mg by mouth daily. fluticasone 50 mcg/actuation nasal spray Commonly known as:  Shayna Berrios INSTILL 2 SPAYS INTO BOTH NOSTRILS NIGHTLY hydrocortisone butyr-emollient 0.1 % topical cream  
Apply twice per day as needed KlonoPIN 0.5 mg tablet Generic drug:  clonazePAM  
Take 0.5 mg by mouth two (2) times a day. LEXAPRO 10 mg tablet Generic drug:  escitalopram oxalate Take 5 mg by mouth nightly. meclizine 12.5 mg tablet Commonly known as:  ANTIVERT  
1 tablet three times per day  
  
 montelukast 10 mg tablet Commonly known as:  SINGULAIR  
1 tablet daily NORPRAMIN PO Take 20 mg by mouth. Prescriptions Sent to Pharmacy Refills  
 hydrocortisone butyr-emollient 0.1 % topical cream 1 Sig: Apply twice per day as needed Class: Normal  
 Pharmacy: [address] [place] [address] #: [phone] Patient Instructions Medicare Wellness Visit, Female The best way to improve and maintain good health is to have a healthy lifestyle by eating a well-balanced diet, exercising regularly, limiting alcohol and stopping smoking. Regular visits with your physician or non-physician health care provider also support your good health. Preventive screening tests can find health problems before they become diseases or illnesses. Preventive services such as immunizations prevent serious infections. All people over age 72 should have a Pneumovax and a Prevnar-13 shot to prevent potentially life threatening infections with the pneumococcus bacteria, a common cause of pneumonia. These are once in a lifetime unless you and your provider decide differently. All people over 65 should have a yearly influenza vaccine or \"flu\" shot. This does not prevent infection with cold viruses but has been proven to prevent hospitalization and death from influenza. Although Medicare part B \"regular Medicare\" currently only covers tetanus vaccination in the context of an injury, a tetanus vaccine (Tdap or Td) is recommended every 10 years. A shingles vaccine is recommended once in a lifetime after age 61. The Shingles vaccine is also not covered by Medicare part B. Note, however, that both the Shingles vaccine and Tdap/Td are generally covered by secondary carriers. Please check your coverage and out of pocket expenses. Consider contacting your local health department because it may stock these vaccines for a reasonable charge. We currently have documentation of the following immunization history for you: 
Immunization History Administered Date(s) Administered  Influenza High Dose Vaccine PF 09/30/2016  Influenza Vaccine (Quad) PF 12/02/2015  Td, Adsorbed PF 07/05/2016 Screening for infection with Hepatitis C is recommended for anyone born between 80 through [address]. The table at the bottom of this document indicates the status of this and other preventive services. A bone mass density test (DEXA) to screen for osteoporosis or thinning of the bones should be done at least once after age 72 and may be done up to every 2 years as determined by you and your health care provider. The most recent DEXA we have on file for you is:   
 
Screening for diabetes mellitus with a blood sugar test (glucose) should be done at least every 3 years until age 79. You and your health care provider may decide whether to continue screening after age 79. The most recent blood glucose we have on file for you is:  
Lab Results Component Value Date/Time Glucose 80 09/30/2016 10:43 AM  
 
 
 
Glaucoma is a disease of the eye due to increased ocular pressure that can lead to blindness. People with risk factors for glaucoma ( race, diabetes, family history) should be screened at least every 2 years by an eye professional.  
 
Cardiovascular screening tests that check for elevated lipids or cholesterol (fatty part of blood) which can lead to heart disease and strokes should be done every 4-6 years through age 79.  You and your health care provider may decide whether to continue screening after age 79. The most recent lipid panel we have on file for you is: No results found for: CHOL, CHOLPOCT, CHOLX, CHLST, CHOLV, HDL, HDLPOC, LDL, LDLCPOC, NLDLCT, DLDL, LDLC, DLDLP, VLDLC, VLDL, TGL, TGLX, TRIGL, [id number], TRIGP, TGLPOCT, CHHD, CHHDX Colorectal cancer screening that evaluates for blood or polyps in your colon for people with average risk should be done yearly as a stool test, every five years as a flexible sigmoidoscope or every 10 years as a colonoscopy up to age 76. You and your health care provider may decide whether to continue screening after age 76. Breast cancer screening with a mammogram is recommended at least once every 2 years  for women age 54-69. You and your health care provider may decide whether to continue screening after age 76. The most recent mammogram we have on file for you is: ROHIT Results (most recent): No results found for this or any previous visit. Screening for cervical cancer with a pap smear is recommended for all women with a cervix until age 72. The frequency of this test is based on the details of her prior pap smear testing. You and your health care provider may decide whether to continue screening after age 72. If you have been a smoker or had family history of abdominal aortic aneurysms, you and your provider may decide to schedule an ultrasound test of your aorta. If you have questions regarding this please ask your health care provider People ages 54 to [de-identified] years who have smoked the equivalent of 1 pack per day for 30 years or more may benefit from screening for lung cancer with a yearly low dose CT scan until they have been non smokers for 15 years. Please ask your health care provider if you have any questions Your Medicare Wellness Exam is recommended annually. Introducing [facility]!    
 [place] introduces [employer] patient portal. Now you can access parts of your medical record, email your doctor's office, and request medication refills online. 1. In your internet browser, go to [email or web address] 2. Click on the First Time User? Click Here link in the Sign In box. You will see the New Member Sign Up page. 3. Enter your [employer] Access Code exactly as it appears below. You will not need to use this code after youve completed the sign-up process. If you do not sign up before the expiration date, you must request a new code. · [employer] Access Code: [email or web address] Expires: 8/6/2017 10:34 AM 
 
4. Enter the last four digits of your Social Security Number (xxxx) and Date of Birth (mm/dd/yyyy) as indicated and click Submit. You will be taken to the next sign-up page. 5. Create a [employer] ID. This will be your [employer] login ID and cannot be changed, so think of one that is secure and easy to remember. 6. Create a [employer] password. You can change your password at any time. 7. Enter your Password Reset Question and Answer. This can be used at a later time if you forget your password. 8. Enter your e-mail address. You will receive e-mail notification when new information is available in [address] 9. Click Sign Up. You can now view and download portions of your medical record. 10. Click the Download Summary menu link to download a portable copy of your medical information. If you have questions, please visit the Frequently Asked Questions section of the [employer] website. Remember, [employer] is NOT to be used for urgent needs. For medical emergencies, dial 911. Now available from your iPhone and Android! Please provide this summary of care documentation to your next provider. Your primary care clinician is listed as Jessie Valdez. If you have any questions after today's visit, please call [phone].

## 2017-05-17 NOTE — TELEPHONE ENCOUNTER
Patient states she is scheduled for breast reconstruction with Dr. Angie Watts on Tuesday, 5/23. This is a 2 1/2 hour surgery. She has come down with a sinus infection and canker type sores. Dr. Rita Zhao office told her if she is on antibiotic, she could still have the surgery. She is also having cluster headaches. She would also like to know what she can take for the pain since she can't take aspirin products prior to surgery. Patient is out of town, please send antibiotic to [facility] in [place], [address] [place] [address] Drive. Please advise on pain relief.

## 2017-05-18 RX ORDER — CEFUROXIME AXETIL 500 MG/1
500 TABLET ORAL 2 TIMES DAILY
Qty: 20 TAB | Refills: 0 | Status: SHIPPED | OUTPATIENT
Start: 2017-05-18 | End: 2017-05-28

## 2017-05-18 RX ORDER — ACYCLOVIR 800 MG/1
800 TABLET ORAL 3 TIMES DAILY
Qty: 30 TAB | Refills: 0 | Status: SHIPPED | OUTPATIENT
Start: 2017-05-18 | End: 2017-05-28

## 2017-05-18 NOTE — TELEPHONE ENCOUNTER
Spoke with patient and informed her that per Dr Nakia Bills we will send in abx. Added that patient can take tylenol 500mg up to 6 daily for pain/headaches. Patient voiced understanding and will let Dr Johnathon Benson office know.

## 2017-05-22 ENCOUNTER — CLINICAL SUPPORT (OUTPATIENT)
Dept: INTERNAL MEDICINE CLINIC | Age: 67
End: 2017-05-22

## 2017-05-22 DIAGNOSIS — R94.31 ABNORMAL EKG: ICD-10-CM

## 2017-05-22 DIAGNOSIS — Z01.818 PRE-OP TESTING: Primary | ICD-10-CM

## 2017-05-23 NOTE — PROGRESS NOTES
EKG in office is unchanged from the recent EKG in the [facility] system -- possible anterior septal MI which does not appear acute. The patient is scheduled for surgery tomorrow. I advised a cardiac work up prior to proceeding with surgery.

## 2017-05-24 ENCOUNTER — HOSPITAL ENCOUNTER (OUTPATIENT)
Dept: NON INVASIVE DIAGNOSTICS | Age: 67
Discharge: HOME | End: 2017-05-24
Attending: INTERNAL MEDICINE
Payer: MEDICARE

## 2017-05-24 DIAGNOSIS — Z01.818 PRE-OP TESTING: ICD-10-CM

## 2017-05-24 DIAGNOSIS — R94.31 ABNORMAL EKG: ICD-10-CM

## 2017-05-24 PROCEDURE — 93306 TTE W/DOPPLER COMPLETE: CPT

## 2017-05-24 NOTE — PROGRESS NOTES
Patient wants to reschedule nuclear stress test due to not feeling well. Explained prep and instructions. Patient is still getting echocardiogram.    Patient armband left on for echo.

## 2017-05-26 ENCOUNTER — TELEPHONE (OUTPATIENT)
Dept: INTERNAL MEDICINE CLINIC | Age: 67
End: 2017-05-26

## 2017-05-26 NOTE — TELEPHONE ENCOUNTER
Was supposed to have ECHO and Nuclear sdtress test but they would not do nuclear test as long as she has HA, needs something to open up her sinuses, uses flonase, wants to know about ECHO results, please call her [phone]

## 2017-05-31 ENCOUNTER — HOSPITAL ENCOUNTER (OUTPATIENT)
Dept: NON INVASIVE DIAGNOSTICS | Age: 67
Discharge: HOME | End: 2017-05-31
Attending: INTERNAL MEDICINE
Payer: MEDICARE

## 2017-05-31 PROCEDURE — 93017 CV STRESS TEST TRACING ONLY: CPT | Performed by: INTERNAL MEDICINE

## 2017-05-31 PROCEDURE — A9500 TC99M SESTAMIBI: HCPCS

## 2017-05-31 PROCEDURE — 74011250636 HC RX REV CODE- 250/636: Performed by: INTERNAL MEDICINE

## 2017-05-31 PROCEDURE — 78452 HT MUSCLE IMAGE SPECT MULT: CPT | Performed by: INTERNAL MEDICINE

## 2017-05-31 RX ORDER — SODIUM CHLORIDE 9 MG/ML
250 INJECTION, SOLUTION INTRAVENOUS ONCE
Status: COMPLETED | OUTPATIENT
Start: 2017-05-31 | End: 2017-05-31

## 2017-05-31 RX ADMIN — REGADENOSON 0.4 MG: 0.08 INJECTION, SOLUTION INTRAVENOUS at 11:00

## 2017-05-31 RX ADMIN — SODIUM CHLORIDE 250 ML: 900 INJECTION, SOLUTION INTRAVENOUS at 10:55

## 2017-05-31 NOTE — PROCEDURES
[address] STRESS    Name:  Xochilt Sen  MR#:  [id number]  :  1950  Account #:  [de-identified]  Date of Adm:  2017  Date of Service:  2017      PHARMACOLOGIC NUCLEAR SCAN RESULTS    PROCEDURE PERFORMED:  Pharmacologic nuclear scan. FINDINGS:  Baseline electrocardiogram showed a normal sinus  rhythm. There were Q-waves noted in V1 and V2. The patient had an IV in the left antecubital space. She received  Lexiscan per protocol. She tolerated the procedure well. No chest  pain was noted. She received a resting dose of sestamibi with 10.80  mCi at 9:45 a.m. She received a stress dose of sestamibi with 32.7  mCi at 11:10. a.m. TREADMILL FINDINGS  1. ST segment changes:  None. 2. Chest pain:  None. 3. Dysrhythmia:  None. 4. Both heart rate and blood pressure responses were normal.    TREADMILL CONCLUSION:  This is a negative pharmacologic stress  test from an electrocardiographic standpoint. MYOCARDIAL NUCLEAR PERFUSION STUDY FINDINGS  1. Perfusion imaging showed significant bowel uptake. However, there  was no obvious reversible defect noted. There was mild hypoperfusion  in the anterior wall during the rest phase, likely consistent with tissue  attenuation. 2. Gated SPECT imaging showed small left ventricular chamber size  and normal LV function, with an estimated ejection fraction of 52%. No  obvious regional wall motion abnormalities were noted. CONCLUSIONS  1. Negative pharmacologic stress test from an electrocardiographic  standpoint. 2. Likely normal perfusion study. 3. Perfusion imaging showed no evidence of significant ongoing  ischemia or prior infarction. As noted above, there was significant  bowel uptake. There was mild hypoperfusion in the anterior wall  without septal involvement only during rest phase, likely consistent with  breast tissue attenuation.   4. Gated SPECT imaging showed a small left ventricular chamber size  and low-normal left ventricular function, with an estimated ejection  fraction of 52%. No obvious regional wall motion abnormalities were  noted. 5. This is a low risk finding.         MD Virginia Guido Finely / 1968 [address]  D:  05/31/2017   13:52  T:  05/31/2017   14:29  Job #:  [id number]

## 2017-05-31 NOTE — PROGRESS NOTES
Patient was given 10.8 mCi of Sestamibi for the Resting pictures. Patient received 0.4 mg of Lexiscan for the exercise portion of the Stress test. Patient was then given 32.7 mCi of Sestamibi for the Stress pictures. Armband was removed and disposed of before the patient left.

## 2017-06-01 LAB
ATTENDING PHYSICIAN, CST07: NORMAL
DIAGNOSIS, 93000: NORMAL
DUKE TM SCORE RESULT, CST14: NORMAL
DUKE TREADMILL SCORE, CST13: NORMAL
ECG INTERP BEFORE EX, CST11: NORMAL
ECG INTERP DURING EX, CST12: NORMAL
FUNCTIONAL CAPACITY, CST17: NORMAL
KNOWN CARDIAC CONDITION, CST08: NORMAL
MAX. DIASTOLIC BP, CST04: 78 MMHG
MAX. HEART RATE, CST05: 111 BPM
MAX. SYSTOLIC BP, CST03: 128 MMHG
OVERALL BP RESPONSE TO EXERCISE, CST16: NORMAL
OVERALL HR RESPONSE TO EXERCISE, CST15: NORMAL
PEAK EX METS, CST10: 1.6 METS
PROTOCOL NAME, CST01: NORMAL
TEST INDICATION, CST09: NORMAL

## 2017-06-02 ENCOUNTER — TELEPHONE (OUTPATIENT)
Dept: INTERNAL MEDICINE CLINIC | Age: 67
End: 2017-06-02

## 2017-06-02 NOTE — TELEPHONE ENCOUNTER
Patient wants someone to call her with the results of the test Dr Levi Cade ordered on her heart Today if possible

## 2017-06-06 NOTE — TELEPHONE ENCOUNTER
Spoke with patient and informed her of normal results on both echo and stress test per Dr Khalif García. Added that Dr Khalif García will clear patient for surgery now when she can get it rescheduled and will see again prior to for clearance if Dr Robinson Cornelius office requests it. Patient voiced understanding and stated that she may put surgery off until fall but will return call to our office if pre op visit needs to be completed again.

## 2017-08-29 ENCOUNTER — TELEPHONE (OUTPATIENT)
Dept: INTERNAL MEDICINE CLINIC | Age: 67
End: 2017-08-29

## 2017-08-29 ENCOUNTER — TELEPHONE ANTICOAG (OUTPATIENT)
Dept: INTERNAL MEDICINE CLINIC | Age: 67
End: 2017-08-29

## 2017-08-29 RX ORDER — CEFUROXIME AXETIL 500 MG/1
TABLET ORAL
Qty: 14 TAB | Refills: 0 | Status: SHIPPED | OUTPATIENT
Start: 2017-08-29 | End: 2017-08-30 | Stop reason: SDUPTHER

## 2017-08-29 RX ORDER — AZITHROMYCIN 250 MG/1
TABLET, FILM COATED ORAL
Qty: 6 TAB | Refills: 0 | Status: SHIPPED | OUTPATIENT
Start: 2017-08-29 | End: 2017-09-02

## 2017-08-29 NOTE — TELEPHONE ENCOUNTER
Patient states she wants a 10 day prescription for Ceftin 500 mg. She states 7 days will not be enough. She is not picking up the 7 day prescription at the pharmacy now. She may come in office 8:00 Wednesday morning to pick it up.

## 2017-08-30 RX ORDER — CEFUROXIME AXETIL 500 MG/1
TABLET ORAL
Qty: 20 TAB | Refills: 0 | Status: SHIPPED | OUTPATIENT
Start: 2017-08-30 | End: 2017-08-30 | Stop reason: SDUPTHER

## 2017-08-30 RX ORDER — CEFUROXIME AXETIL 500 MG/1
TABLET ORAL
Qty: 20 TAB | Refills: 0 | Status: SHIPPED | OUTPATIENT
Start: 2017-08-30 | End: 2018-02-14 | Stop reason: SDUPTHER

## 2017-09-05 RX ORDER — FLUTICASONE PROPIONATE 50 MCG
SPRAY, SUSPENSION (ML) NASAL
Qty: 1 BOTTLE | Refills: 3 | Status: SHIPPED | OUTPATIENT
Start: 2017-09-05 | End: 2017-10-04 | Stop reason: SDUPTHER

## 2017-09-22 ENCOUNTER — TELEPHONE (OUTPATIENT)
Dept: INTERNAL MEDICINE CLINIC | Age: 67
End: 2017-09-22

## 2017-09-22 DIAGNOSIS — E87.5 SERUM POTASSIUM ELEVATED: Primary | ICD-10-CM

## 2017-09-25 NOTE — TELEPHONE ENCOUNTER
Called and informed patient that per Dr Laureano Borden we need to repeat labs to double check potassium level. Patient voiced understanding and will go to [facility] tomorrow for basic.

## 2017-09-26 ENCOUNTER — HOSPITAL ENCOUNTER (OUTPATIENT)
Dept: LAB | Age: 67
Discharge: HOME | End: 2017-09-26
Payer: MEDICARE

## 2017-09-26 DIAGNOSIS — E87.5 SERUM POTASSIUM ELEVATED: ICD-10-CM

## 2017-09-26 LAB
ANION GAP SERPL CALC-SCNC: 7 MMOL/L (ref 3–18)
BUN SERPL-MCNC: 16 MG/DL (ref 7–18)
BUN/CREAT SERPL: 21 (ref 12–20)
CALCIUM SERPL-MCNC: 9.1 MG/DL (ref 8.5–10.1)
CHLORIDE SERPL-SCNC: 103 MMOL/L (ref 100–108)
CO2 SERPL-SCNC: 31 MMOL/L (ref 21–32)
CREAT SERPL-MCNC: 0.75 MG/DL (ref 0.6–1.3)
GLUCOSE SERPL-MCNC: 96 MG/DL (ref 74–99)
POTASSIUM SERPL-SCNC: 4.6 MMOL/L (ref 3.5–5.5)
SODIUM SERPL-SCNC: 141 MMOL/L (ref 136–145)

## 2017-09-26 PROCEDURE — 36415 COLL VENOUS BLD VENIPUNCTURE: CPT | Performed by: INTERNAL MEDICINE

## 2017-09-26 PROCEDURE — 80048 BASIC METABOLIC PNL TOTAL CA: CPT | Performed by: INTERNAL MEDICINE

## 2017-09-27 ENCOUNTER — TELEPHONE (OUTPATIENT)
Dept: INTERNAL MEDICINE CLINIC | Age: 67
End: 2017-09-27

## 2017-09-27 NOTE — TELEPHONE ENCOUNTER
Called and left message for patient to return call to the office. Repeat labs show potassium level is normal. (4.6 with normal range 3.5-5.5) Patient will need no treatment or extra labs at this time.

## 2017-09-28 ENCOUNTER — TELEPHONE (OUTPATIENT)
Dept: INTERNAL MEDICINE CLINIC | Age: 67
End: 2017-09-28

## 2017-09-28 NOTE — TELEPHONE ENCOUNTER
Called and spoke with patient--informed her that she can continue her regular diet just to not take any extra potassium supplements. Understanding was voiced.

## 2017-09-28 NOTE — TELEPHONE ENCOUNTER
Gave patient potassium result. She would like to know how much potassium she should be taking in daily. She would like to speak to a nurse. Please call her at [phone].

## 2017-10-04 RX ORDER — FLUTICASONE PROPIONATE 50 MCG
SPRAY, SUSPENSION (ML) NASAL
Qty: 3 BOTTLE | Refills: 3 | Status: SHIPPED | OUTPATIENT
Start: 2017-10-04 | End: 2018-06-10 | Stop reason: SDUPTHER

## 2017-10-09 RX ORDER — FLUTICASONE PROPIONATE 44 UG/1
2 AEROSOL, METERED RESPIRATORY (INHALATION) 2 TIMES DAILY
Qty: 1 INHALER | Refills: 3 | Status: SHIPPED | OUTPATIENT
Start: 2017-10-09 | End: 2018-02-28 | Stop reason: ALTCHOICE

## 2017-10-09 NOTE — TELEPHONE ENCOUNTER
Requested Prescriptions     Pending Prescriptions Disp Refills    fluticasone (FLOVENT HFA) 44 mcg/actuation inhaler 1 Inhaler 3     Sig: Take 2 Puffs by inhalation two (2) times a day.

## 2017-11-27 ENCOUNTER — CLINICAL SUPPORT (OUTPATIENT)
Dept: INTERNAL MEDICINE CLINIC | Age: 67
End: 2017-11-27

## 2017-11-27 DIAGNOSIS — Z23 ENCOUNTER FOR IMMUNIZATION: ICD-10-CM

## 2018-01-05 ENCOUNTER — TELEPHONE (OUTPATIENT)
Dept: INTERNAL MEDICINE CLINIC | Age: 68
End: 2018-01-05

## 2018-01-05 RX ORDER — AZITHROMYCIN 250 MG/1
TABLET, FILM COATED ORAL
Qty: 6 TAB | Refills: 0 | Status: SHIPPED | OUTPATIENT
Start: 2018-01-05 | End: 2018-01-10

## 2018-01-05 NOTE — TELEPHONE ENCOUNTER
Patient want Dr Tono Crowder to call her something in for congestion and would like a antibiotic for sinus infection  Thanks please call in today

## 2018-01-09 ENCOUNTER — TELEPHONE (OUTPATIENT)
Dept: INTERNAL MEDICINE CLINIC | Age: 68
End: 2018-01-09

## 2018-02-14 ENCOUNTER — OFFICE VISIT (OUTPATIENT)
Dept: INTERNAL MEDICINE CLINIC | Age: 68
End: 2018-02-14

## 2018-02-14 ENCOUNTER — TELEPHONE (OUTPATIENT)
Dept: INTERNAL MEDICINE CLINIC | Age: 68
End: 2018-02-14

## 2018-02-14 VITALS
OXYGEN SATURATION: 99 % | HEART RATE: 78 BPM | WEIGHT: 115 LBS | TEMPERATURE: 99.6 F | HEIGHT: 59 IN | DIASTOLIC BLOOD PRESSURE: 68 MMHG | BODY MASS INDEX: 23.18 KG/M2 | SYSTOLIC BLOOD PRESSURE: 120 MMHG | RESPIRATION RATE: 18 BRPM

## 2018-02-14 DIAGNOSIS — J06.9 UPPER RESPIRATORY TRACT INFECTION, UNSPECIFIED TYPE: Primary | ICD-10-CM

## 2018-02-14 DIAGNOSIS — R05.9 COUGH: ICD-10-CM

## 2018-02-14 RX ORDER — CEFUROXIME AXETIL 500 MG/1
TABLET ORAL
Qty: 20 TAB | Refills: 0 | Status: SHIPPED | OUTPATIENT
Start: 2018-02-14 | End: 2018-02-27 | Stop reason: ALTCHOICE

## 2018-02-14 RX ORDER — PREDNISONE 20 MG/1
TABLET ORAL
Qty: 8 TAB | Refills: 0 | Status: SHIPPED | OUTPATIENT
Start: 2018-02-14 | End: 2018-02-28 | Stop reason: ALTCHOICE

## 2018-02-14 RX ORDER — CODEINE PHOSPHATE AND GUAIFENESIN 10; 100 MG/5ML; MG/5ML
5 SOLUTION ORAL
Qty: 118 ML | Refills: 0 | Status: SHIPPED | OUTPATIENT
Start: 2018-02-14 | End: 2018-02-27 | Stop reason: ALTCHOICE

## 2018-02-14 NOTE — PROGRESS NOTES
Letitia Ling is a 80 y.o. female presenting today for Nasal Congestion (x1 week); Fever; and Sore Throat  . HPI:  Letitia Ling presents to the office today for sore throat, back pain, non-productive cough, fever and nasal congestion x 6 days. Review of Systems   Constitutional: Positive for chills and fever. HENT: Positive for congestion and sore throat. Respiratory: Positive for cough and shortness of breath. Negative for sputum production. Cardiovascular: Negative for chest pain and palpitations. Musculoskeletal: Positive for back pain. No Known Allergies    Current Outpatient Prescriptions   Medication Sig Dispense Refill    cefUROXime (CEFTIN) 500 mg tablet 1 tablet twice per day 20 Tab 0    predniSONE (DELTASONE) 20 mg tablet 2 tabs daily x 2 days, 1 tab daily x 2 days, 1/2 tab daily x 4 days 8 Tab 0    guaiFENesin-codeine (ROBITUSSIN AC) 100-10 mg/5 mL solution Take 5 mL by mouth three (3) times daily as needed for Cough. Max Daily Amount: 15 mL. Do not drive if taking this medication 118 mL 0    fluticasone (FLOVENT HFA) 44 mcg/actuation inhaler Take 2 Puffs by inhalation two (2) times a day. 1 Inhaler 3    fluticasone (FLONASE) 50 mcg/actuation nasal spray USE 2 SPRAYS INTO BOTH NOSTRILS NIGHTLY 3 Bottle 3    hydrocortisone butyr-emollient 0.1 % topical cream Apply twice per day as needed 4 oz 1    montelukast (SINGULAIR) 10 mg tablet 1 tablet daily 90 Tab 3    meclizine (ANTIVERT) 12.5 mg tablet 1 tablet three times per day 40 Tab 1    levomilnacipran (FETZIMA) 20 mg capsule Take 20 mg by mouth daily.  DESIPRAMINE HCL (NORPRAMIN PO) Take 20 mg by mouth.  loratadine (CLARITIN) 10 mg tablet Take 10 mg by mouth daily.  escitalopram (LEXAPRO) 10 mg tablet Take 5 mg by mouth nightly.  clonazePAM (KLONOPIN) 0.5 mg tablet Take 0.5 mg by mouth two (2) times a day.          Past Medical History:   Diagnosis Date    Acute bronchitis     Acute upper respiratory infection     Asthma     Cancer ([facility] [address]     Headache     Low back pain     Other ill-defined conditions(799.89)     cataracts    Psychiatric disorder     anxiety issues    Vision decreased     lens implants from cataracts       Past Surgical History:   Procedure Laterality Date    BREAST SURGERY PROCEDURE UNLISTED Bilateral 9/2008    mastectomy with reconstruction    HX COLONOSCOPY  12/2012    neg    HX HEENT      cataract    HX KNEE ARTHROSCOPY Bilateral     HX OTHER SURGICAL      sinus surgery    HX TONSILLECTOMY         Social History     Social History    Marital status:      Spouse name: N/A    Number of children: N/A    Years of education: N/A     Occupational History    Not on file. Social History Main Topics    Smoking status: Never Smoker    Smokeless tobacco: Never Used    Alcohol use No    Drug use: No    Sexual activity: Not Currently     Other Topics Concern    Not on file     Social History Narrative       Patient does not have an advanced directive on file    Vitals:    02/14/18 1059   BP: 120/68   Pulse: 78   Resp: 18   Temp: 99.6 °F (37.6 °C)   TempSrc: Tympanic   SpO2: 99%   Weight: 115 lb (52.2 kg)   Height: 4' 11\" (1.499 m)   PainSc:   8   PainLoc: Throat       Physical Exam   Constitutional: No distress. HENT:   Right Ear: External ear normal.   Left Ear: External ear normal.   Mouth/Throat: No oropharyngeal exudate. Neck: Normal range of motion. Cardiovascular: Normal rate and regular rhythm. Pulmonary/Chest: Effort normal. She has rhonchi in the right upper field, the right middle field, the left upper field and the left lower field. Lymphadenopathy:     She has no cervical adenopathy. Nursing note and vitals reviewed. No visits with results within 3 Month(s) from this visit.   Latest known visit with results is:    Hospital Outpatient Visit on 09/26/2017   Component Date Value Ref Range Status    Sodium 09/26/2017 141  136 - 145 mmol/L Final    Potassium 09/26/2017 4.6  3.5 - 5.5 mmol/L Final    Chloride 09/26/2017 103  100 - 108 mmol/L Final    CO2 09/26/2017 31  21 - 32 mmol/L Final    Anion gap 09/26/2017 7  3.0 - 18 mmol/L Final    Glucose 09/26/2017 96  74 - 99 mg/dL Final    BUN 09/26/2017 16  7.0 - 18 MG/DL Final    Creatinine 09/26/2017 0.75  0.6 - 1.3 MG/DL Final    BUN/Creatinine ratio 09/26/2017 21* 12 - 20   Final    GFR est AA 09/26/2017 >60  >60 ml/min/1.73m2 Final    GFR est non-AA 09/26/2017 >60  >60 ml/min/1.73m2 Final    Comment: (NOTE)  Estimated GFR is calculated using the Modification of Diet in Renal   Disease (MDRD) Study equation, reported for both  Americans   (GFRAA) and non- Americans (GFRNA), and normalized to 1.73m2   body surface area. The physician must decide which value applies to   the patient. The MDRD study equation should only be used in   individuals age 25 or older. It has not been validated for the   following: pregnant women, patients with serious comorbid conditions,   or on certain medications, or persons with extremes of body size,   muscle mass, or nutritional status.  Calcium 09/26/2017 9.1  8.5 - 10.1 MG/DL Final       .No results found for any visits on 02/14/18. Assessment / Plan:      ICD-10-CM ICD-9-CM    1. Upper respiratory tract infection, unspecified type J06.9 465.9 cefUROXime (CEFTIN) 500 mg tablet   2. Cough R05 786.2 predniSONE (DELTASONE) 20 mg tablet      guaiFENesin-codeine (ROBITUSSIN AC) 100-10 mg/5 mL solution     Ceftin rx  Prednisone rx  Cheratussin rx  F/u prn      Follow-up Disposition:  Return if symptoms worsen or fail to improve. I asked the patient if she  had any questions and answered her  questions.   The patient stated that she understands the treatment plan and agrees with the treatment plan

## 2018-02-15 ENCOUNTER — TELEPHONE (OUTPATIENT)
Dept: INTERNAL MEDICINE CLINIC | Age: 68
End: 2018-02-15

## 2018-02-15 NOTE — TELEPHONE ENCOUNTER
Patient contacted and advised that it was ok to stop robitussin AC and to contact office if she still was not feeling better by Monday.  She expressed understanding

## 2018-02-15 NOTE — TELEPHONE ENCOUNTER
Patient called to speak to the nurse. She stated that she feels like she may have a secondary infection (bronchitis). She took the cough medicine last night and stated it made her sinuses close and now she is not coughing up anything. She feels like it is doing more harm and doesn't want to take it anymore. Please call her at [phone].

## 2018-02-27 ENCOUNTER — OFFICE VISIT (OUTPATIENT)
Dept: INTERNAL MEDICINE CLINIC | Age: 68
End: 2018-02-27

## 2018-02-27 VITALS
RESPIRATION RATE: 16 BRPM | HEIGHT: 59 IN | TEMPERATURE: 99.6 F | WEIGHT: 112 LBS | BODY MASS INDEX: 22.58 KG/M2 | SYSTOLIC BLOOD PRESSURE: 132 MMHG | HEART RATE: 97 BPM | OXYGEN SATURATION: 99 % | DIASTOLIC BLOOD PRESSURE: 88 MMHG

## 2018-02-27 DIAGNOSIS — K59.09 OTHER CONSTIPATION: ICD-10-CM

## 2018-02-27 DIAGNOSIS — J01.00 ACUTE NON-RECURRENT MAXILLARY SINUSITIS: Primary | ICD-10-CM

## 2018-02-27 DIAGNOSIS — J45.40 MODERATE PERSISTENT REACTIVE AIRWAY DISEASE WITHOUT COMPLICATION: ICD-10-CM

## 2018-02-27 RX ORDER — CLARITHROMYCIN 500 MG/1
TABLET, FILM COATED ORAL
Qty: 20 TAB | Refills: 0 | Status: SHIPPED | OUTPATIENT
Start: 2018-02-27 | End: 2018-03-15 | Stop reason: ALTCHOICE

## 2018-02-27 NOTE — PROGRESS NOTES
Padilla Velásquez is a 80 y.o. female    Chief Complaint   Patient presents with    Fatigue     Patient stated that she is just getting over the flu and her symptoms have not improved      1. Have you been to the ER, urgent care clinic since your last visit? Hospitalized since your last visit? No     2. Have you seen or consulted any other health care providers outside of the [address] [place] [address] since your last visit? Include any pap smears or colon screening.   No     Visit Vitals    /88 (BP 1 Location: Left arm, BP Patient Position: Sitting)    Pulse 97    Temp 99.6 °F (37.6 °C) (Oral)    Resp 16    Ht 4' 11\" (1.499 m)    Wt 112 lb (50.8 kg)    SpO2 99%    BMI 22.62 kg/m2

## 2018-02-27 NOTE — PATIENT INSTRUCTIONS
Fatigue: Care Instructions  Your Care Instructions    Fatigue is a feeling of tiredness, exhaustion, or lack of energy. You may feel fatigue because of too much or not enough activity. It can also come from stress, lack of sleep, boredom, and poor diet. Many medical problems, such as viral infections, can cause fatigue. Emotional problems, especially depression, are often the cause of fatigue. Fatigue is most often a symptom of another problem. Treatment for fatigue depends on the cause. For example, if you have fatigue because you have a certain health problem, treating this problem also treats your fatigue. If depression or anxiety is the cause, treatment may help. Follow-up care is a key part of your treatment and safety. Be sure to make and go to all appointments, and call your doctor if you are having problems. It's also a good idea to know your test results and keep a list of the medicines you take. How can you care for yourself at home? · Get regular exercise. But don't overdo it. Go back and forth between rest and exercise. · Get plenty of rest.  · Eat a healthy diet. Do not skip meals, especially breakfast.  · Reduce your use of caffeine, tobacco, and alcohol. Caffeine is most often found in coffee, tea, cola drinks, and chocolate. · Limit medicines that can cause fatigue. This includes tranquilizers and cold and allergy medicines. When should you call for help? Watch closely for changes in your health, and be sure to contact your doctor if:  ? · You have new symptoms such as fever or a rash. ? · Your fatigue gets worse. ? · You have been feeling down, depressed, or hopeless. Or you may have lost interest in things that you usually enjoy. ? · You are not getting better as expected. Where can you learn more? Go to http://seun-jessika.info/. Enter Z203 in the search box to learn more about \"Fatigue: Care Instructions. \"  Current as of: March 20, 2017  Content Version: 11.4  © 0199-2930 Healthwise, Incorporated. Care instructions adapted under license by [employer] (which disclaims liability or warranty for this information). If you have questions about a medical condition or this instruction, always ask your healthcare professional. [address] any warranty or liability for your use of this information.

## 2018-02-27 NOTE — MR AVS SNAPSHOT
[facility] 
 
 
 [address], [address] [place] [address] 
[phone] Patient: Indigo Johnson MRN: [id number] [facility]:[birth year] Visit Information Date & Time Provider Department Dept. Phone Encounter #  
 2/27/2018  9:00 AM Philomena Perales MD [facility] [phone] [id number] Upcoming Health Maintenance Date Due Hepatitis C Screening 1950 BREAST CANCER SCRN MAMMOGRAM 8/1/2010 ZOSTER VACCINE AGE 60> 11/12/2010 OSTEOPOROSIS SCREENING (DEXA) 1/12/2016 Pneumococcal 65+ Low/Medium Risk (1 of 2 - PCV13) 1/12/2016 MEDICARE YEARLY EXAM 1/12/2016 DTaP/Tdap/Td series (1 - Tdap) 7/6/2016 GLAUCOMA SCREENING Q2Y 11/23/2018 COLONOSCOPY 5/1/2027 Allergies as of 2/27/2018  Review Complete On: 2/27/2018 By: Edison Cook, Certified Medical Assistant No Known Allergies Current Immunizations  Reviewed on 9/13/2017 Name Date Influenza High Dose Vaccine PF 11/27/2017, 9/30/2016 Influenza Vaccine (Quad) PF 12/2/2015  2:46 PM  
 Td, Adsorbed PF 7/5/2016 Not reviewed this visit Vitals BP Pulse Temp Resp Height(growth percentile) Weight(growth percentile) 132/88 (BP 1 Location: Left arm, BP Patient Position: Sitting) 97 99.6 °F (37.6 °C) (Oral) 16 4' 11\" (1.499 m) 112 lb (50.8 kg) SpO2 BMI OB Status Smoking Status 99% 22.62 kg/m2 Postmenopausal Never Smoker Vitals History BMI and BSA Data Body Mass Index Body Surface Area  
 22.62 kg/m 2 1.45 m 2 Preferred Pharmacy Pharmacy Name Phone [facility] #[id number] [facility] [facility] [phone] Your Updated Medication List  
  
   
This list is accurate as of 2/27/18 10:43 AM.  Always use your most recent med list.  
  
  
  
  
 clarithromycin 500 mg tablet Commonly known as:  BIAXIN  
1 tab twice per day with food CLARITIN 10 mg tablet Generic drug:  loratadine Take 10 mg by mouth daily. FETZIMA 20 mg capsule Generic drug:  levomilnacipran Take 20 mg by mouth daily. * fluticasone 50 mcg/actuation nasal spray Commonly known as:  FLONASE  
USE 2 SPRAYS INTO BOTH NOSTRILS NIGHTLY * fluticasone 44 mcg/actuation inhaler Commonly known as:  FLOVENT HFA Take 2 Puffs by inhalation two (2) times a day. hydrocortisone butyr-emollient 0.1 % topical cream  
Apply twice per day as needed KlonoPIN 0.5 mg tablet Generic drug:  clonazePAM  
Take 0.5 mg by mouth two (2) times a day. LEXAPRO 10 mg tablet Generic drug:  escitalopram oxalate Take 5 mg by mouth nightly. meclizine 12.5 mg tablet Commonly known as:  ANTIVERT  
1 tablet three times per day  
  
 montelukast 10 mg tablet Commonly known as:  SINGULAIR  
1 tablet daily NORPRAMIN PO Take 20 mg by mouth.  
  
 predniSONE 20 mg tablet Commonly known as:  DELTASONE  
2 tabs daily x 2 days, 1 tab daily x 2 days, 1/2 tab daily x 4 days * Notice: This list has 2 medication(s) that are the same as other medications prescribed for you. Read the directions carefully, and ask your doctor or other care provider to review them with you. Prescriptions Sent to Pharmacy Refills  
 clarithromycin (BIAXIN) 500 mg tablet 0 Si tab twice per day with food Class: Normal  
 Pharmacy: [facility]/pharmacy [address] [place] [address],[address] #: [phone] Patient Instructions Fatigue: Care Instructions Your Care Instructions Fatigue is a feeling of tiredness, exhaustion, or lack of energy. You may feel fatigue because of too much or not enough activity. It can also come from stress, lack of sleep, boredom, and poor diet. Many medical problems, such as viral infections, can cause fatigue. Emotional problems, especially depression, are often the cause of fatigue. Fatigue is most often a symptom of another problem. Treatment for fatigue depends on the cause. For example, if you have fatigue because you have a certain health problem, treating this problem also treats your fatigue. If depression or anxiety is the cause, treatment may help. Follow-up care is a key part of your treatment and safety. Be sure to make and go to all appointments, and call your doctor if you are having problems. It's also a good idea to know your test results and keep a list of the medicines you take. How can you care for yourself at home? · Get regular exercise. But don't overdo it. Go back and forth between rest and exercise. · Get plenty of rest. 
· Eat a healthy diet. Do not skip meals, especially breakfast. 
· Reduce your use of caffeine, tobacco, and alcohol. Caffeine is most often found in coffee, tea, cola drinks, and chocolate. · Limit medicines that can cause fatigue. This includes tranquilizers and cold and allergy medicines. When should you call for help? Watch closely for changes in your health, and be sure to contact your doctor if: 
? · You have new symptoms such as fever or a rash. ? · Your fatigue gets worse. ? · You have been feeling down, depressed, or hopeless. Or you may have lost interest in things that you usually enjoy. ? · You are not getting better as expected. Where can you learn more? Go to http://seun-jessika.info/. Enter A734 in the search box to learn more about \"Fatigue: Care Instructions. \" Current as of: March 20, 2017 Content Version: 11.4 © 1578-1649 [employer]. Care instructions adapted under license by [employer] (which disclaims liability or warranty for this information). If you have questions about a medical condition or this instruction, always ask your healthcare professional. [address] any warranty or liability for your use of this information. Introducing [facility]! [facility] introduces Loved.la patient portal. Now you can access parts of your medical record, email your doctor's office, and request medication refills online. 1. In your internet browser, go to [email or web address] 2. Click on the First Time User? Click Here link in the Sign In box. You will see the New Member Sign Up page. 3. Enter your [email or web address] Access Code exactly as it appears below. You will not need to use this code after youve completed the sign-up process. If you do not sign up before the expiration date, you must request a new code. · [email or web address] Access Code: [email or web address] Expires: 5/15/2018 11:23 AM 
 
4. Enter the last four digits of your Social Security Number (xxxx) and Date of Birth (mm/dd/yyyy) as indicated and click Submit. You will be taken to the next sign-up page. 5. Create a Loved.la ID. This will be your [email or web address] login ID and cannot be changed, so think of one that is secure and easy to remember. 6. Create a Loved.la password. You can change your password at any time. 7. Enter your Password Reset Question and Answer. This can be used at a later time if you forget your password. 8. Enter your e-mail address. You will receive e-mail notification when new information is available in [address] 9. Click Sign Up. You can now view and download portions of your medical record. 10. Click the Download Summary menu link to download a portable copy of your medical information. If you have questions, please visit the Frequently Asked Questions section of the [email or web address] website. Remember, Loved.la is NOT to be used for urgent needs. For medical emergencies, dial 911. Now available from your iPhone and Android! Please provide this summary of care documentation to your next provider. Your primary care clinician is listed as Arsenio Goldberg. If you have any questions after today's visit, please call [phone].

## 2018-02-28 NOTE — PROGRESS NOTES
The patient presents to the office today with the chief complaint of Sinus Congestion    HPI    The patient complains of sinus congestion with right facial fullness. she denies fever. The patient had a chest infection recently which has greatly improved but the sinus has worsened  The patient has reactive airways but she has not been wheezing. .      Review of Systems   HENT: Positive for congestion. Respiratory: Negative for shortness of breath. Cardiovascular: Negative for chest pain and leg swelling. No Known Allergies    Current Outpatient Prescriptions   Medication Sig Dispense Refill    clarithromycin (BIAXIN) 500 mg tablet 1 tab twice per day with food 20 Tab 0    fluticasone (FLONASE) 50 mcg/actuation nasal spray USE 2 SPRAYS INTO BOTH NOSTRILS NIGHTLY 3 Bottle 3    hydrocortisone butyr-emollient 0.1 % topical cream Apply twice per day as needed 4 oz 1    montelukast (SINGULAIR) 10 mg tablet 1 tablet daily 90 Tab 3    meclizine (ANTIVERT) 12.5 mg tablet 1 tablet three times per day 40 Tab 1    levomilnacipran (FETZIMA) 20 mg capsule Take 20 mg by mouth daily.  DESIPRAMINE HCL (NORPRAMIN PO) Take 20 mg by mouth.  loratadine (CLARITIN) 10 mg tablet Take 10 mg by mouth daily.  escitalopram (LEXAPRO) 10 mg tablet Take 5 mg by mouth nightly.  clonazePAM (KLONOPIN) 0.5 mg tablet Take 0.5 mg by mouth two (2) times a day.          Past Medical History:   Diagnosis Date    Acute bronchitis     Acute upper respiratory infection     Asthma     Cancer ([facility] [address]     Headache     Low back pain     Other ill-defined conditions(799.89)     cataracts    Psychiatric disorder     anxiety issues    Vision decreased     lens implants from cataracts       Past Surgical History:   Procedure Laterality Date    BREAST SURGERY PROCEDURE UNLISTED Bilateral 9/2008    mastectomy with reconstruction    HX COLONOSCOPY  12/2012    neg    HX HEENT      cataract    HX KNEE ARTHROSCOPY Bilateral  HX OTHER SURGICAL      sinus surgery    HX TONSILLECTOMY         Social History     Social History    Marital status:      Spouse name: N/A    Number of children: N/A    Years of education: N/A     Occupational History    Not on file. Social History Main Topics    Smoking status: Never Smoker    Smokeless tobacco: Never Used    Alcohol use No    Drug use: No    Sexual activity: Not Currently     Other Topics Concern    Not on file     Social History Narrative       Patient does not have an advanced directive on file    Visit Vitals    /88 (BP 1 Location: Left arm, BP Patient Position: Sitting)    Pulse 97    Temp 99.6 °F (37.6 °C) (Oral)    Resp 16    Ht 4' 11\" (1.499 m)    Wt 112 lb (50.8 kg)    SpO2 99%    BMI 22.62 kg/m2       Physical Exam   No Cervical Lymphadenopathy  No Supraclavicular Lymphadenopathy  Thyroid is Normal  Lungs are normal to percussion. Clear to auscultation   Heart:  S1 S2 are normal, No gallops, No mummers  No Carotid Bruits  Abdomen:  Normal Bowel Sounds. No tenderness. No masses. No Hepatomegaly or Splenomegly  LE:  Strong Pedal Pulses. No Edema    BMI:  OK    No visits with results within 3 Month(s) from this visit.   Latest known visit with results is:    Hospital Outpatient Visit on 09/26/2017   Component Date Value Ref Range Status    Sodium 09/26/2017 141  136 - 145 mmol/L Final    Potassium 09/26/2017 4.6  3.5 - 5.5 mmol/L Final    Chloride 09/26/2017 103  100 - 108 mmol/L Final    CO2 09/26/2017 31  21 - 32 mmol/L Final    Anion gap 09/26/2017 7  3.0 - 18 mmol/L Final    Glucose 09/26/2017 96  74 - 99 mg/dL Final    BUN 09/26/2017 16  7.0 - 18 MG/DL Final    Creatinine 09/26/2017 0.75  0.6 - 1.3 MG/DL Final    BUN/Creatinine ratio 09/26/2017 21* 12 - 20   Final    GFR est AA 09/26/2017 >60  >60 ml/min/1.73m2 Final    GFR est non-AA 09/26/2017 >60  >60 ml/min/1.73m2 Final    Comment: (NOTE)  Estimated GFR is calculated using the Modification of Diet in Renal   Disease (MDRD) Study equation, reported for both  Americans   (GFRAA) and non- Americans (GFRNA), and normalized to 1.73m2   body surface area. The physician must decide which value applies to   the patient. The MDRD study equation should only be used in   individuals age 25 or older. It has not been validated for the   following: pregnant women, patients with serious comorbid conditions,   or on certain medications, or persons with extremes of body size,   muscle mass, or nutritional status.  Calcium 09/26/2017 9.1  8.5 - 10.1 MG/DL Final       .No results found for any visits on 02/27/18. Assessment / Plan      ICD-10-CM ICD-9-CM    1. Acute non-recurrent maxillary sinusitis J01.00 461.0    2. Other constipation K59.09 564.09    3. Moderate persistent reactive airway disease without complication N47.38 828.64        Biaxin  Flonase  she was advised to continue her maintenance medications  she is to call if symptoms persist over five days    Follow-up Disposition:  Return in about 3 months (around 5/27/2018). I asked Esha Nichols if she has any questions and I answered the questions.   Esha Nichols states that she understands the treatment plan and agrees with the treatment plan

## 2018-03-01 ENCOUNTER — TELEPHONE (OUTPATIENT)
Dept: INTERNAL MEDICINE CLINIC | Age: 68
End: 2018-03-01

## 2018-03-01 NOTE — TELEPHONE ENCOUNTER
I called patient to see what her O2 sats are registering, She is 99% on room air. Her machine is registering lower when she talks, but she denies being SOB. Patient has an appointment in the morning. I let her know that if she is symptomatic with feeling low O2 to go to the ER. She verbalized understanding.

## 2018-03-01 NOTE — TELEPHONE ENCOUNTER
Patient is asking Dr Nick Hahn Nurse to call her she is having questions about her oxygen levels [phone]

## 2018-03-02 ENCOUNTER — HOSPITAL ENCOUNTER (OUTPATIENT)
Dept: LAB | Age: 68
Discharge: HOME | End: 2018-03-02
Payer: MEDICARE

## 2018-03-02 ENCOUNTER — OFFICE VISIT (OUTPATIENT)
Dept: INTERNAL MEDICINE CLINIC | Age: 68
End: 2018-03-02

## 2018-03-02 VITALS
BODY MASS INDEX: 22.58 KG/M2 | RESPIRATION RATE: 16 BRPM | SYSTOLIC BLOOD PRESSURE: 120 MMHG | HEIGHT: 59 IN | DIASTOLIC BLOOD PRESSURE: 84 MMHG | HEART RATE: 102 BPM | WEIGHT: 112 LBS | OXYGEN SATURATION: 100 % | TEMPERATURE: 99 F

## 2018-03-02 DIAGNOSIS — J01.00 ACUTE NON-RECURRENT MAXILLARY SINUSITIS: Primary | ICD-10-CM

## 2018-03-02 DIAGNOSIS — J45.40 MODERATE PERSISTENT REACTIVE AIRWAY DISEASE WITHOUT COMPLICATION: ICD-10-CM

## 2018-03-02 LAB
ALBUMIN SERPL-MCNC: 4.1 G/DL (ref 3.4–5)
ALBUMIN/GLOB SERPL: 1.5 {RATIO} (ref 0.8–1.7)
ALP SERPL-CCNC: 83 U/L (ref 45–117)
ALT SERPL-CCNC: 17 U/L (ref 13–56)
ANION GAP SERPL CALC-SCNC: 7 MMOL/L (ref 3–18)
AST SERPL-CCNC: 24 U/L (ref 15–37)
BASOPHILS # BLD: 0 K/UL (ref 0–0.1)
BASOPHILS NFR BLD: 0 % (ref 0–2)
BILIRUB SERPL-MCNC: 0.6 MG/DL (ref 0.2–1)
BUN SERPL-MCNC: 13 MG/DL (ref 7–18)
BUN/CREAT SERPL: 17 (ref 12–20)
CALCIUM SERPL-MCNC: 8.7 MG/DL (ref 8.5–10.1)
CHLORIDE SERPL-SCNC: 100 MMOL/L (ref 100–108)
CO2 SERPL-SCNC: 30 MMOL/L (ref 21–32)
CREAT SERPL-MCNC: 0.76 MG/DL (ref 0.6–1.3)
DIFFERENTIAL METHOD BLD: NORMAL
EOSINOPHIL # BLD: 0.1 K/UL (ref 0–0.4)
EOSINOPHIL NFR BLD: 1 % (ref 0–5)
ERYTHROCYTE [DISTWIDTH] IN BLOOD BY AUTOMATED COUNT: 13.3 % (ref 11.6–14.5)
GLOBULIN SER CALC-MCNC: 2.8 G/DL (ref 2–4)
GLUCOSE SERPL-MCNC: 79 MG/DL (ref 74–99)
HCT VFR BLD AUTO: 40.9 % (ref 35–45)
HGB BLD-MCNC: 12.9 G/DL (ref 12–16)
LYMPHOCYTES # BLD: 2 K/UL (ref 0.9–3.6)
LYMPHOCYTES NFR BLD: 32 % (ref 21–52)
MCH RBC QN AUTO: 25.5 PG (ref 24–34)
MCHC RBC AUTO-ENTMCNC: 31.5 G/DL (ref 31–37)
MCV RBC AUTO: 80.8 FL (ref 74–97)
MONOCYTES # BLD: 0.4 K/UL (ref 0.05–1.2)
MONOCYTES NFR BLD: 7 % (ref 3–10)
NEUTS SEG # BLD: 3.8 K/UL (ref 1.8–8)
NEUTS SEG NFR BLD: 60 % (ref 40–73)
PLATELET # BLD AUTO: 269 K/UL (ref 135–420)
PMV BLD AUTO: 11.2 FL (ref 9.2–11.8)
POTASSIUM SERPL-SCNC: 4.7 MMOL/L (ref 3.5–5.5)
PROT SERPL-MCNC: 6.9 G/DL (ref 6.4–8.2)
RBC # BLD AUTO: 5.06 M/UL (ref 4.2–5.3)
SODIUM SERPL-SCNC: 137 MMOL/L (ref 136–145)
WBC # BLD AUTO: 6.3 K/UL (ref 4.6–13.2)

## 2018-03-02 PROCEDURE — 36415 COLL VENOUS BLD VENIPUNCTURE: CPT | Performed by: INTERNAL MEDICINE

## 2018-03-02 PROCEDURE — 80053 COMPREHEN METABOLIC PANEL: CPT | Performed by: INTERNAL MEDICINE

## 2018-03-02 PROCEDURE — 85025 COMPLETE CBC W/AUTO DIFF WBC: CPT | Performed by: INTERNAL MEDICINE

## 2018-03-02 RX ORDER — PREDNISONE 20 MG/1
TABLET ORAL
Qty: 20 TAB | Refills: 0 | Status: SHIPPED | OUTPATIENT
Start: 2018-03-02 | End: 2018-03-15 | Stop reason: ALTCHOICE

## 2018-03-02 NOTE — PATIENT INSTRUCTIONS
Health Maintenance Due   Topic Date Due    Hepatitis C Test  1950    Breast Cancer Screening  08/01/2010    Shingles Vaccine  11/12/2010    Bone Density Screening  01/12/2016    Pneumococcal Vaccine (1 of 2 - PCV13) 01/12/2016    Annual Well Visit  01/12/2016    DTaP/Tdap/Td  (1 - Tdap) 07/06/2016

## 2018-03-02 NOTE — PROGRESS NOTES
1. Have you been to the ER, urgent care clinic since your last visit? Hospitalized since your last visit? No    2. Have you seen or consulted any other health care providers outside of the [address] [place] [address] since your last visit? Include any pap smears or colon screening.  No

## 2018-03-02 NOTE — MR AVS SNAPSHOT
Fannie Pulse 
 
 
 [address], [address] [facility] [address] 
[phone] Patient: Padilla Velásquez MRN: [id number] GIANNA:[birth year] Visit Information Date & Time Provider Department Dept. Phone Encounter #  
 3/2/2018  9:45 AM Janice Glaser MD [facility] INTERNAL MEDICINE OF [address] [id number] Your Appointments 3/15/2018 10:45 AM  
Office Visit with Janice Glaser MD  
[facility] INTERNAL MEDICINE OF [place] [address]) Appt Note: 2 weeks f/u  
 [address], [address]  
  
   
 [address], [address] [facility] [address] Upcoming Health Maintenance Date Due Hepatitis C Screening 1950 BREAST CANCER SCRN MAMMOGRAM 8/1/2010 ZOSTER VACCINE AGE 60> 11/12/2010 OSTEOPOROSIS SCREENING (DEXA) 1/12/2016 Pneumococcal 65+ Low/Medium Risk (1 of 2 - PCV13) 1/12/2016 MEDICARE YEARLY EXAM 1/12/2016 DTaP/Tdap/Td series (1 - Tdap) 7/6/2016 GLAUCOMA SCREENING Q2Y 11/23/2018 COLONOSCOPY 5/1/2027 Allergies as of 3/2/2018  Review Complete On: 3/2/2018 By: Zayda Justin LPN No Known Allergies Current Immunizations  Reviewed on 9/13/2017 Name Date Influenza High Dose Vaccine PF 11/27/2017, 9/30/2016 Influenza Vaccine (Quad) PF 12/2/2015  2:46 PM  
 Td, Adsorbed PF 7/5/2016 Not reviewed this visit You Were Diagnosed With   
  
 Codes Comments Acute non-recurrent maxillary sinusitis    -  Primary ICD-10-CM: J01.00 ICD-9-CM: 461.0 Vitals BP Pulse Temp Resp Height(growth percentile) Weight(growth percentile) 120/84 (BP 1 Location: Left arm, BP Patient Position: Sitting) (!) 102 99 °F (37.2 °C) (Tympanic) 16 4' 11\" (1.499 m) 112 lb (50.8 kg) SpO2 BMI OB Status Smoking Status 100% 22.62 kg/m2 Postmenopausal Never Smoker Vitals History BMI and BSA Data Body Mass Index Body Surface Area 22.62 kg/m 2 1.45 m 2 Preferred Pharmacy Pharmacy Name Phone [id number] [address] [facility] [phone] Your Updated Medication List  
  
   
This list is accurate as of 3/2/18 10:45 AM.  Always use your most recent med list.  
  
  
  
  
 clarithromycin 500 mg tablet Commonly known as:  BIAXIN  
1 tab twice per day with food CLARITIN 10 mg tablet Generic drug:  loratadine Take 10 mg by mouth daily. FETZIMA 20 mg capsule Generic drug:  levomilnacipran Take 20 mg by mouth daily. fluticasone 50 mcg/actuation nasal spray Commonly known as:  FLONASE  
USE 2 SPRAYS INTO BOTH NOSTRILS NIGHTLY  
  
 hydrocortisone butyr-emollient 0.1 % topical cream  
Apply twice per day as needed KlonoPIN 0.5 mg tablet Generic drug:  clonazePAM  
Take 0.5 mg by mouth two (2) times a day. LEXAPRO 10 mg tablet Generic drug:  escitalopram oxalate Take 5 mg by mouth nightly. meclizine 12.5 mg tablet Commonly known as:  ANTIVERT  
1 tablet three times per day  
  
 montelukast 10 mg tablet Commonly known as:  SINGULAIR  
1 tablet daily NORPRAMIN PO Take 20 mg by mouth.  
  
 predniSONE 20 mg tablet Commonly known as:  DELTASONE  
3 tabs daily x 3 days, 2 tabs daily x 3 days, 1 tab daily x 3 days, 1/2 tab daily x 3 days Prescriptions Sent to Pharmacy Refills  
 predniSONE (DELTASONE) 20 mg tablet 0 Sig: 3 tabs daily x 3 days, 2 tabs daily x 3 days, 1 tab daily x 3 days, 1/2 tab daily x 3 days Class: Normal  
 Pharmacy: [facility]/pharmacy [address] [place] [address], [facility] [facility] [place] [address] Ph #: [phone] To-Do List   
 03/02/2018 Lab:  METABOLIC PANEL, COMPREHENSIVE   
  
 07/02/2018 Lab:  CBC WITH AUTOMATED DIFF Patient Instructions Health Maintenance Due Topic Date Due  
 Hepatitis C Test  1950  Breast Cancer Screening  08/01/2010  Shingles Vaccine  11/12/2010  Bone Density Screening  01/12/2016  Pneumococcal Vaccine (1 of 2 - PCV13) 01/12/2016  Annual Well Visit  01/12/2016  
 DTaP/Tdap/Td  (1 - Tdap) 07/06/2016 Introducing [facility]! Abi Hartman introduces [employer] patient portal. Now you can access parts of your medical record, email your doctor's office, and request medication refills online. 1. In your internet browser, go to [email or web address] 2. Click on the First Time User? Click Here link in the Sign In box. You will see the New Member Sign Up page. 3. Enter your [employer] Access Code exactly as it appears below. You will not need to use this code after youve completed the sign-up process. If you do not sign up before the expiration date, you must request a new code. · [employer] Access Code: [email or web address] Expires: 5/15/2018 11:23 AM 
 
4. Enter the last four digits of your Social Security Number (xxxx) and Date of Birth (mm/dd/yyyy) as indicated and click Submit. You will be taken to the next sign-up page. 5. Create a [employer] ID. This will be your [employer] login ID and cannot be changed, so think of one that is secure and easy to remember. 6. Create a [employer] password. You can change your password at any time. 7. Enter your Password Reset Question and Answer. This can be used at a later time if you forget your password. 8. Enter your e-mail address. You will receive e-mail notification when new information is available in [address] 9. Click Sign Up. You can now view and download portions of your medical record. 10. Click the Download Summary menu link to download a portable copy of your medical information. If you have questions, please visit the Frequently Asked Questions section of the [employer] website. Remember, [employer] is NOT to be used for urgent needs. For medical emergencies, dial 911. Now available from your iPhone and Android! Please provide this summary of care documentation to your next provider. Your primary care clinician is listed as Donzella Hammans. If you have any questions after today's visit, please call [phone].

## 2018-03-03 PROBLEM — J45.909 REACTIVE AIRWAY DISEASE: Status: ACTIVE | Noted: 2018-03-03

## 2018-03-03 NOTE — PROGRESS NOTES
The patient presents to the office today with the chief complaint of Sinus Congestion    HPI    The patient complains of continued sinus congestion. There is drainage that is green. she denies fever. The patient has reactive airway disease. This is doing ok. Review of Systems   HENT: Positive for congestion. Respiratory: Negative for shortness of breath. Cardiovascular: Negative for chest pain and leg swelling. No Known Allergies    Current Outpatient Prescriptions   Medication Sig Dispense Refill    predniSONE (DELTASONE) 20 mg tablet 3 tabs daily x 3 days, 2 tabs daily x 3 days, 1 tab daily x 3 days, 1/2 tab daily x 3 days 20 Tab 0    clarithromycin (BIAXIN) 500 mg tablet 1 tab twice per day with food 20 Tab 0    fluticasone (FLONASE) 50 mcg/actuation nasal spray USE 2 SPRAYS INTO BOTH NOSTRILS NIGHTLY 3 Bottle 3    montelukast (SINGULAIR) 10 mg tablet 1 tablet daily 90 Tab 3    levomilnacipran (FETZIMA) 20 mg capsule Take 20 mg by mouth daily.  DESIPRAMINE HCL (NORPRAMIN PO) Take 20 mg by mouth.  loratadine (CLARITIN) 10 mg tablet Take 10 mg by mouth daily.  escitalopram (LEXAPRO) 10 mg tablet Take 5 mg by mouth nightly.  clonazePAM (KLONOPIN) 0.5 mg tablet Take 0.5 mg by mouth two (2) times a day.          Past Medical History:   Diagnosis Date    Acute bronchitis     Acute upper respiratory infection     Asthma     Cancer ([facility] [address]     Headache     Low back pain     Other ill-defined conditions(799.89)     cataracts    Psychiatric disorder     anxiety issues    Vision decreased     lens implants from cataracts       Past Surgical History:   Procedure Laterality Date    BREAST SURGERY PROCEDURE UNLISTED Bilateral 9/2008    mastectomy with reconstruction    HX COLONOSCOPY  12/2012    neg    HX HEENT      cataract    HX KNEE ARTHROSCOPY Bilateral     HX OTHER SURGICAL      sinus surgery    HX TONSILLECTOMY         Social History     Social History    Marital status:      Spouse name: N/A    Number of children: N/A    Years of education: N/A     Occupational History    Not on file. Social History Main Topics    Smoking status: Never Smoker    Smokeless tobacco: Never Used    Alcohol use No    Drug use: No    Sexual activity: Not Currently     Other Topics Concern    Not on file     Social History Narrative       Patient does not have an advanced directive on file    Visit Vitals    /84 (BP 1 Location: Left arm, BP Patient Position: Sitting)    Pulse (!) 102    Temp 99 °F (37.2 °C) (Tympanic)    Resp 16    Ht 4' 11\" (1.499 m)    Wt 112 lb (50.8 kg)    SpO2 100%    BMI 22.62 kg/m2       Physical Exam   No Cervical Lymphadenopathy  No Supraclavicular Lymphadenopathy  Thyroid is Normal  Lungs are normal to percussion. Clear to auscultation   Heart:  S1 S2 are normal, No gallops, No mummers  No Carotid Bruits  Abdomen:  Normal Bowel Sounds. No tenderness. No masses. No Hepatomegaly or Splenomegly  LE:  Strong Pedal Pulses. No Edema      BMI:  [facility] Outpatient Visit on 03/02/2018   Component Date Value Ref Range Status    WBC 03/02/2018 6.3  4.6 - 13.2 K/uL Final    RBC 03/02/2018 5.06  4.20 - 5.30 M/uL Final    HGB 03/02/2018 12.9  12.0 - 16.0 g/dL Final    HCT 03/02/2018 40.9  35.0 - 45.0 % Final    MCV 03/02/2018 80.8  74.0 - 97.0 FL Final    MCH 03/02/2018 25.5  24.0 - 34.0 PG Final    MCHC 03/02/2018 31.5  31.0 - 37.0 g/dL Final    RDW 03/02/2018 13.3  11.6 - 14.5 % Final    PLATELET 03/22/9958 731  135 - 420 K/uL Final    MPV 03/02/2018 11.2  9.2 - 11.8 FL Final    NEUTROPHILS 03/02/2018 60  40 - 73 % Final    LYMPHOCYTES 03/02/2018 32  21 - 52 % Final    MONOCYTES 03/02/2018 7  3 - 10 % Final    EOSINOPHILS 03/02/2018 1  0 - 5 % Final    BASOPHILS 03/02/2018 0  0 - 2 % Final    ABS. NEUTROPHILS 03/02/2018 3.8  1.8 - 8.0 K/UL Final    ABS. LYMPHOCYTES 03/02/2018 2.0  0.9 - 3.6 K/UL Final    ABS.  MONOCYTES 03/02/2018 0.4  0.05 - 1.2 K/UL Final    ABS. EOSINOPHILS 03/02/2018 0.1  0.0 - 0.4 K/UL Final    ABS. BASOPHILS 03/02/2018 0.0  0.0 - 0.1 K/UL Final    DF 03/02/2018 AUTOMATED    Final    Sodium 03/02/2018 137  136 - 145 mmol/L Final    Potassium 03/02/2018 4.7  3.5 - 5.5 mmol/L Final    Chloride 03/02/2018 100  100 - 108 mmol/L Final    CO2 03/02/2018 30  21 - 32 mmol/L Final    Anion gap 03/02/2018 7  3.0 - 18 mmol/L Final    Glucose 03/02/2018 79  74 - 99 mg/dL Final    BUN 03/02/2018 13  7.0 - 18 MG/DL Final    Creatinine 03/02/2018 0.76  0.6 - 1.3 MG/DL Final    BUN/Creatinine ratio 03/02/2018 17  12 - 20   Final    GFR est AA 03/02/2018 >60  >60 ml/min/1.73m2 Final    GFR est non-AA 03/02/2018 >60  >60 ml/min/1.73m2 Final    Comment: (NOTE)  Estimated GFR is calculated using the Modification of Diet in Renal   Disease (MDRD) Study equation, reported for both  Americans   (GFRAA) and non- Americans (GFRNA), and normalized to 1.73m2   body surface area. The physician must decide which value applies to   the patient. The MDRD study equation should only be used in   individuals age 25 or older. It has not been validated for the   following: pregnant women, patients with serious comorbid conditions,   or on certain medications, or persons with extremes of body size,   muscle mass, or nutritional status.  Calcium 03/02/2018 8.7  8.5 - 10.1 MG/DL Final    Bilirubin, total 03/02/2018 0.6  0.2 - 1.0 MG/DL Final    ALT (SGPT) 03/02/2018 17  13 - 56 U/L Final    AST (SGOT) 03/02/2018 24  15 - 37 U/L Final    Alk.  phosphatase 03/02/2018 83  45 - 117 U/L Final    Protein, total 03/02/2018 6.9  6.4 - 8.2 g/dL Final    Albumin 03/02/2018 4.1  3.4 - 5.0 g/dL Final    Globulin 03/02/2018 2.8  2.0 - 4.0 g/dL Final    A-G Ratio 03/02/2018 1.5  0.8 - 1.7   Final       .  Results for orders placed or performed during the hospital encounter of 03/02/18   CBC WITH AUTOMATED DIFF   Result Value Ref Range    WBC 6.3 4.6 - 13.2 K/uL    RBC 5.06 4.20 - 5.30 M/uL    HGB 12.9 12.0 - 16.0 g/dL    HCT 40.9 35.0 - 45.0 %    MCV 80.8 74.0 - 97.0 FL    MCH 25.5 24.0 - 34.0 PG    MCHC 31.5 31.0 - 37.0 g/dL    RDW 13.3 11.6 - 14.5 %    PLATELET 766 377 - 229 K/uL    MPV 11.2 9.2 - 11.8 FL    NEUTROPHILS 60 40 - 73 %    LYMPHOCYTES 32 21 - 52 %    MONOCYTES 7 3 - 10 %    EOSINOPHILS 1 0 - 5 %    BASOPHILS 0 0 - 2 %    ABS. NEUTROPHILS 3.8 1.8 - 8.0 K/UL    ABS. LYMPHOCYTES 2.0 0.9 - 3.6 K/UL    ABS. MONOCYTES 0.4 0.05 - 1.2 K/UL    ABS. EOSINOPHILS 0.1 0.0 - 0.4 K/UL    ABS. BASOPHILS 0.0 0.0 - 0.1 K/UL    DF AUTOMATED     METABOLIC PANEL, COMPREHENSIVE   Result Value Ref Range    Sodium 137 136 - 145 mmol/L    Potassium 4.7 3.5 - 5.5 mmol/L    Chloride 100 100 - 108 mmol/L    CO2 30 21 - 32 mmol/L    Anion gap 7 3.0 - 18 mmol/L    Glucose 79 74 - 99 mg/dL    BUN 13 7.0 - 18 MG/DL    Creatinine 0.76 0.6 - 1.3 MG/DL    BUN/Creatinine ratio 17 12 - 20      GFR est AA >60 >60 ml/min/1.73m2    GFR est non-AA >60 >60 ml/min/1.73m2    Calcium 8.7 8.5 - 10.1 MG/DL    Bilirubin, total 0.6 0.2 - 1.0 MG/DL    ALT (SGPT) 17 13 - 56 U/L    AST (SGOT) 24 15 - 37 U/L    Alk. phosphatase 83 45 - 117 U/L    Protein, total 6.9 6.4 - 8.2 g/dL    Albumin 4.1 3.4 - 5.0 g/dL    Globulin 2.8 2.0 - 4.0 g/dL    A-G Ratio 1.5 0.8 - 1.7         Assessment / Plan      ICD-10-CM ICD-9-CM    1. Acute non-recurrent maxillary sinusitis J01.00 461.0 CBC WITH AUTOMATED DIFF      METABOLIC PANEL, COMPREHENSIVE   2. Moderate persistent reactive airway disease without complication V56.66 359.31        Labs  Finish up antibiotics and course of Prednisone  she was advised to continue her maintenance medications  she is to call if symptoms persist over five days        Follow-up Disposition:  Return in about 2 weeks (around 3/16/2018). I asked Cy Murray if she has any questions and I answered the questions.   Cy Murray states that she understands the treatment plan and agrees with the treatment plan

## 2018-03-15 ENCOUNTER — OFFICE VISIT (OUTPATIENT)
Dept: INTERNAL MEDICINE CLINIC | Age: 68
End: 2018-03-15

## 2018-03-15 ENCOUNTER — HOSPITAL ENCOUNTER (OUTPATIENT)
Dept: LAB | Age: 68
Discharge: HOME | End: 2018-03-15
Payer: MEDICARE

## 2018-03-15 VITALS
SYSTOLIC BLOOD PRESSURE: 124 MMHG | DIASTOLIC BLOOD PRESSURE: 70 MMHG | OXYGEN SATURATION: 97 % | HEART RATE: 96 BPM | WEIGHT: 112 LBS | RESPIRATION RATE: 16 BRPM | TEMPERATURE: 98.6 F | HEIGHT: 59 IN | BODY MASS INDEX: 22.58 KG/M2

## 2018-03-15 DIAGNOSIS — Z13.6 ENCOUNTER FOR LIPID SCREENING FOR CARDIOVASCULAR DISEASE: ICD-10-CM

## 2018-03-15 DIAGNOSIS — R53.82 CHRONIC FATIGUE: ICD-10-CM

## 2018-03-15 DIAGNOSIS — Z78.0 MENOPAUSE: ICD-10-CM

## 2018-03-15 DIAGNOSIS — Z13.220 ENCOUNTER FOR LIPID SCREENING FOR CARDIOVASCULAR DISEASE: ICD-10-CM

## 2018-03-15 DIAGNOSIS — Z00.00 INITIAL MEDICARE ANNUAL WELLNESS VISIT: ICD-10-CM

## 2018-03-15 DIAGNOSIS — Z00.00 INITIAL MEDICARE ANNUAL WELLNESS VISIT: Primary | ICD-10-CM

## 2018-03-15 LAB
CHOLEST SERPL-MCNC: 202 MG/DL
TSH SERPL DL<=0.05 MIU/L-ACNC: 1.94 UIU/ML (ref 0.36–3.74)

## 2018-03-15 PROCEDURE — 84443 ASSAY THYROID STIM HORMONE: CPT | Performed by: INTERNAL MEDICINE

## 2018-03-15 PROCEDURE — 82465 ASSAY BLD/SERUM CHOLESTEROL: CPT | Performed by: INTERNAL MEDICINE

## 2018-03-15 RX ORDER — DESIPRAMINE HYDROCHLORIDE 10 MG/1
20 TABLET ORAL
COMMUNITY
End: 2018-11-30 | Stop reason: SDUPTHER

## 2018-03-15 NOTE — PROGRESS NOTES
Dr. Alexus Dawson  referred Lorri Irby (1950) a 80 y.o. female for a Medicare Annual Wellness Visit ([facility]). This is an Initial Medicare Annual Wellness Exam (AWV) (Performed 12 months after IPPE or effective date of Medicare Part B enrollment, Once in a lifetime)    I have reviewed the patient's medical history in detail and updated the computerized patient record. History     Past Medical History:   Diagnosis Date    Acute bronchitis     Acute upper respiratory infection     Asthma     Cancer ([address]     Headache     Low back pain     Other ill-defined conditions(799.89)     cataracts    Psychiatric disorder     anxiety issues    Vision decreased     lens implants from cataracts      Past Surgical History:   Procedure Laterality Date    BREAST SURGERY PROCEDURE UNLISTED Bilateral 9/2008    mastectomy with reconstruction    HX BREAST RECONSTRUCTION  10/05/2017    bilateral breast implants with Dr. Vish De La Cruz HX COLONOSCOPY  12/2012    neg    HX COLONOSCOPY  05/01/2017    Dr. Gertrudis Farrell, repeat in 10 years    HX HEENT Bilateral 2012    cataracts, left in June, right in July    HX KNEE ARTHROSCOPY Bilateral     HX OTHER SURGICAL      sinus surgery    HX TONSILLECTOMY       Current Outpatient Prescriptions   Medication Sig Dispense Refill    desipramine (NOPRAMIN) 10 mg tablet Take 20 mg by mouth nightly.  fluticasone (FLONASE) 50 mcg/actuation nasal spray USE 2 SPRAYS INTO BOTH NOSTRILS NIGHTLY 3 Bottle 3    montelukast (SINGULAIR) 10 mg tablet 1 tablet daily 90 Tab 3    levomilnacipran (FETZIMA) 20 mg capsule Take 20 mg by mouth daily.  loratadine (CLARITIN) 10 mg tablet Take 10 mg by mouth daily.  escitalopram (LEXAPRO) 10 mg tablet Take 5 mg by mouth nightly.  clonazePAM (KLONOPIN) 0.5 mg tablet Take 0.5 mg by mouth two (2) times a day.        No Known Allergies  Family History   Problem Relation Age of Onset    Diabetes Mother     Cancer Paternal Grandmother      colon     Social History   Substance Use Topics    Smoking status: Never Smoker    Smokeless tobacco: Never Used    Alcohol use No     Patient Active Problem List   Diagnosis Code    Other constipation K59.09    Depression F32.9    Allergic rhinitis due to allergen J30.9    Dizziness R42    Reactive airway disease J45.909         Depression Risk Factor Screening:     PHQ over the last two weeks 3/15/2018   Little interest or pleasure in doing things Not at all   Feeling down, depressed or hopeless Not at all   Total Score PHQ 2 0       Alcohol Risk Factor Screening: You do not drink alcohol or very rarely. Functional Ability and Level of Safety:     Hearing Loss   Hearing is good. Activities of Daily Living   The home contains: no safety equipment  Patient does total self care    ADL Assessment 3/15/2018   Feeding yourself No Help Needed   Getting from bed to chair No Help Needed   Getting dressed No Help Needed   Bathing or showering No Help Needed   Walk across the room (includes cane/walker) No Help Needed   Using the telphone No Help Needed   Taking your medications No Help Needed   Preparing meals No Help Needed   Managing money (expenses/bills) No Help Needed   Moderately strenuous housework (laundry) No Help Needed   Shopping for personal items (toiletries/medicines) No Help Needed   Shopping for groceries No Help Needed   Driving No Help Needed   Climbing a flight of stairs No Help Needed   Getting to places beyond walking distances No Help Needed         Fall Risk     Fall Risk Assessment, last 12 mths 3/15/2018   Able to walk? Yes   Fall in past 12 months? No       Abuse Screen   Patient is not abused    Abuse Screening Questionnaire 3/15/2018   Do you ever feel afraid of your partner? N   Are you in a relationship with someone who physically or mentally threatens you? N   Is it safe for you to go home?  Y         Cognitive Screening   Evaluation of Cognitive Function:  Has your family/caregiver stated any concerns about your memory: no  Normal, Mini Cog test, scored 5/5  Mood/affect:  neutral  Appearance: age appropriate, casually dressed and within normal Limits  Family member/caregiver input: N/A    Physical Examination     No exam data present  Visit Vitals    /70 (BP 1 Location: Left arm, BP Patient Position: Sitting)    Pulse 96    Temp 98.6 °F (37 °C) (Tympanic)    Resp 16    Ht 4' 11\" (1.499 m)    Wt 112 lb (50.8 kg)    SpO2 97%    BMI 22.62 kg/m2       No exam performed by pharmacist today, not required for Medicare Annual Wellness Visit. Patient to be seen by Dr. Gagan Yarbrough separately. Patient Care Team     Patient Care Team:  Gagan Yarbrough MD as PCP - General (Internal Medicine)  David Cervantes MD as Physician (Plastic Surgery)  Malia Hobbs MD as Physician (Ophthalmology)  Gab Avila MD as Physician (Psychiatry)  Charlie Keller MD as Physician (Ophthalmology)  Jose Miguel Lan MD as Physician (Gastroenterology)    Assessment/Plan     Education and counseling provided:  Are appropriate based on today's review and evaluation  End-of-Life planning (with patient's consent)  Pneumococcal Vaccine  Screening Pap and pelvic (covered once every 2 years)  Colorectal cancer screening tests  Cardiovascular screening blood test  Bone mass measurement (DEXA)  Screening for glaucoma  Diabetes screening test  Tdap / Zoster vaccination recommendations    Diagnoses and all orders for this visit:    1. Initial Medicare annual wellness visit  -     DEXA BONE DENSITY STUDY AXIAL; Future  -     TSH 3RD GENERATION; Future  -     CHOLESTEROL, TOTAL; Future  -     TSH 3RD GENERATION; Future    2. Menopause  -     DEXA BONE DENSITY STUDY AXIAL; Future  -     TSH 3RD GENERATION; Future  -     CHOLESTEROL, TOTAL; Future  -     TSH 3RD GENERATION; Future    3. Chronic fatigue  -     DEXA BONE DENSITY STUDY AXIAL; Future  -     TSH 3RD GENERATION;  Future  - CHOLESTEROL, TOTAL; Future  -     TSH 3RD GENERATION; Future    4. Encounter for lipid screening for cardiovascular disease  -     DEXA BONE DENSITY STUDY AXIAL; Future  -     TSH 3RD GENERATION; Future  -     CHOLESTEROL, TOTAL; Future  -     TSH 3RD GENERATION; Future       5. Medication Reconciliation: Performed today. Patient did not bring her medications to the visit. During the patient interview, the following changes were made:    Discontinued: Biaxin, prednisone   Additions: none   Changes / other discrepancies: clarified dosing of desipramine    Patient sees Dr. Mayito Carrera for psych medications. She reports only being able to tolerate low doses of various agents but she has to split them so that daily meds are either taken in the AM or the PM, but not together due to intolerance. Patient is not taking calcium/vitamin D with known history of osteopenia since at least 2009. Patient questioned whether she should take any vitamins/supplements. Advised patient to discuss further with Dr. Gomez Thornton but recommended calcium 1200 mg/day in divided doses along with cholecalciferol 800 international units daily. Medications Discontinued During This Encounter   Medication Reason    clarithromycin (BIAXIN) 500 mg tablet Therapy Completed    predniSONE (DELTASONE) 20 mg tablet Therapy Completed    DESIPRAMINE HCL (NORPRAMIN PO) Dose Adjustment       6.  Screenings and Immunizations (see patient instructions for chart/information):  Mammogram: no longer indicated per Dr. Gomez Thornton due to surgical history (bilateral mastectomy with extensive reconstruction and most recently implants in Fall 2017)  Pap: none for a \"few years\", not routinely recommended by age but patient requested that Dr. Gomez Thornton recommend a GYN for at least 1 more screening - will defer to MD   DEXA scan: Last 11/27/2012 osteopenia, due for repeat monitoring  Colonoscopy: Up to date 5/1/2017, due for repeat in 2027 per Dr. Trini Walker screening/Eye exam: Up to date Fall 2017 per patient, due for repeat every 6 months per Dr. Guerra [employer] panel: None on file, due for screening with routine fasting labs   Diabetes: Up to date 3/2/2018, due for repeat with routine fasting labs     Immunizations: Patient confirmed the following records of vaccinations are correct and current. Immunization History   Administered Date(s) Administered    Influenza High Dose Vaccine PF 09/30/2016, 11/27/2017    Influenza Vaccine (Quad) PF 12/02/2015    Td 12/12/2009    Td, Adsorbed PF 07/05/2016       Pneumococcal:  Recommended patient receive [id number] first and PPSV23 one year later here or at her pharmacy with pharmacy records faxed to the office. Prevnar-13 out of stock today and patient would like to receive at future visit. Influenza:  Complete, due again in Fall. Zoster:  Discussed new recommendations for Shingrix vaccination once available. Recommended that patient receive at her pharmacy and have pharmacy records faxed to the office. Tdap:  Recommended that patient receive at the office with a signed waiver, at the [facility] or at her pharmacy and have pharmacy records faxed to the office. 7. Advanced Care Planning: Patient educated on the importance of an advanced care plan and paperwork was given to the patient today. Asked patient to read over the information and bring back the completed form to her next appointment so it can be scanned into the chart. Patient verbalized understanding of information presented. Answered all of the patient's questions. AVS information was reviewed with patient and will be printed on checkout.     Basil Minor, PharmD, BCACP    Health Maintenance Due   Topic Date Due    Hepatitis C Screening  1950    BREAST CANCER SCRN MAMMOGRAM  08/01/2010    ZOSTER VACCINE AGE 60>  11/12/2010    Bone Densitometry (Dexa) Screening  01/12/2016    Pneumococcal 65+ Low/Medium Risk (1 of 2 - PCV13) 01/12/2016    MEDICARE YEARLY EXAM  01/12/2016    DTaP/Tdap/Td series (1 - Tdap) 07/06/2016

## 2018-03-15 NOTE — PATIENT INSTRUCTIONS
Medicare Wellness Visit, Female    The best way to improve and maintain good health is to have a healthy lifestyle by eating a well-balanced diet, exercising regularly, limiting alcohol and stopping smoking. Regular physical exams and screening tests are another way to keep healthy. Preventive exams provided by your health care provider can find health problems before they become diseases or illnesses. Preventive services including immunizations, screening tests, monitoring and exams can help you take care of your own health. Preventive services such as immunizations prevent serious infections. All people over age 72 should have a Pneumovax and a Prevnar-13 shot to prevent potentially life threatening infections with the pneumococcus bacteria, a common cause of pneumonia. These are once in a lifetime unless you and your provider decide differently. Next due: Prevnar- 13 first and then Pneumovax one year later    All people over 65 should have a yearly influenza vaccine or \"flu\" shot. This does not prevent infection with cold viruses but has been proven to prevent hospitalization and death from influenza. Next due: due again in Fall    Although Medicare part B \"regular Medicare\" currently only covers tetanus vaccination in the context of an injury, a tetanus vaccine (Tdap or Td) is recommended every 10 years. Tdap is generally given once in a lifetime for older adults. Next due: Tdap    A shingles vaccine is recommended as you get older. Zostavax is a once in a lifetime vaccine given over age 61years of age. There is also a new shingles vaccine, Shingrix, that is now preferred over Zostavax. Shingrix (a 2-dose series) is recommended if you are over age 48years of age and you've never received a shingles vaccine. It is also recommended for revaccination if you've previously received Zostavax. The Shingles vaccines are not covered by Medicare part B.  Next due: Shingrix once available    Note, however, that both the Shingles vaccine and Tdap/Td are generally covered by secondary carriers. Please check your coverage and out of pocket expenses. Your pharmacy benefits may cover these vaccines so please check with your pharmacist.  Also consider contacting your local health department because it may stock these vaccines for a reasonable charge. We currently have documentation of the following immunization history for you:  Immunization History   Administered Date(s) Administered    Influenza High Dose Vaccine PF 09/30/2016, 11/27/2017    Influenza Vaccine (Quad) PF 12/02/2015    Td 12/12/2009    Td, Adsorbed PF 07/05/2016       A bone mass density test (DEXA) to screen for osteoporosis or thinning of the bones should be done at least once after age 72 and may be done up to every 2 years as determined by you and your health care provider. The most recent DEXA we have on file for you is:  DEXA Results (most recent):  11/27/2012 osteopenia Next due: follow-up monitoring per Dr. Abdirizak Smiley  No results found for this or any previous visit. Screening for diabetes mellitus with a blood sugar test (glucose) should be done every year. If you have diabetes, this monitoring will be done more frequently (usually every 3-6 months) and will include A1C testing. The most recent blood glucose we have on file for you is:   Lab Results   Component Value Date/Time    Glucose 79 03/02/2018 01:04 PM       Glaucoma is a disease of the eye due to increased ocular pressure that can lead to blindness.  People with risk factors for glaucoma ( race,  American race, diabetes, family history) should be screened every year by an eye professional.   Last done: Fall 2017  Next due: every 6 months per Dr. Andre Marshall (please have him send the report to Dr. Abdirizak Smiley)    Cardiovascular screening tests that check for elevated lipids or cholesterol (fatty part of blood) which can lead to heart disease and strokes should be done every 5 years. The most recent lipid panel we have on file for you is:   No results found for: CHOL, CHOLPOCT, CHOLX, CHLST, CHOLV, HDL, HDLPOC, LDL, LDLCPOC, LDLC, DLDLP, VLDLC, VLDL, TGLX, TRIGL, TRIGP, TGLPOCT, CHHD, CHHDX  Next due: with routine fasting labs    Colorectal cancer screening that evaluates for blood or polyps in your colon for people with average risk should be done yearly as a stool test, every five years as a flexible sigmoidoscope or every 10 years as a colonoscopy up to age 76. You and your health care provider may decide whether to continue screening after age 76 or if you need to be screened more frequently. Last done:  5/1/2017  Next due: 2027 per Dr. Pierce Burrell    Breast cancer screening with a mammogram is recommended at least once every 2 years  for women age 54-69. You and your health care provider may decide whether to continue screening after age 76. The most recent mammogram we have on file for you is:   No longer indicated based on surgical history    Screening for cervical cancer with a pap smear and pelvic exam is recommended for all women with a cervix until age 72. The frequency of this test is based on the details of your prior pap smear testing (usually every 1 or 2 years - more often with increased risk of cervical cancer or positive family history). You and your health care provider may decide whether to continue screening after age 72. Next due: no longer routinely recommended unless otherwise advised    Screening for infection with Hepatitis C is recommended for anyone born between [birth year] through [address]. People ages 54 to [de-identified] years who have smoked the equivalent of 1 pack per day for 30 years or more may benefit from screening for lung cancer with a yearly low dose CT scan until they have been non smokers for 15 years. Please ask your health care provider if you have any questions. Your Medicare Wellness Exam is recommended annually.     If you do not have Advanced Directives on file with our office and you either have the completed document at home or you fill out the forms provided, please bring a copy to the office to be scanned into your record.     Here is a list of your current Health Maintenance items with a due date:  Health Maintenance Due   Topic Date Due    Hepatitis C Test  1950    Breast Cancer Screening  08/01/2010    Shingles Vaccine  11/12/2010    Bone Mineral Density   01/12/2016    Pneumococcal Vaccine (1 of 2 - PCV13) 01/12/2016    Annual Well Visit  01/12/2016    DTaP/Tdap/Td  (1 - Tdap) 07/06/2016

## 2018-03-15 NOTE — MR AVS SNAPSHOT
[address] [place] [address], [address] [place] [address] 
[phone] Patient: Ruth Webb MRN: [id number] Visit Information Date & Time Provider Department Dept. Phone Encounter #  
 3/15/2018 10:45 AM Gagan Yarbrough MD [facility] INTERNAL MEDICINE OF Sherie Hanson [phone] [id number] Upcoming Health Maintenance Date Due Hepatitis C Screening 1950 BREAST CANCER SCRN MAMMOGRAM 8/1/2010 ZOSTER VACCINE AGE 60> 11/12/2010 Bone Densitometry (Dexa) Screening 1/12/2016 Pneumococcal 65+ Low/Medium Risk (1 of 2 - PCV13) 1/12/2016 MEDICARE YEARLY EXAM 1/12/2016 DTaP/Tdap/Td series (1 - Tdap) 7/6/2016 GLAUCOMA SCREENING Q2Y 11/23/2018 COLONOSCOPY 5/1/2027 Allergies as of 3/15/2018  Review Complete On: 3/15/2018 By: Lizette Fitch LPN No Known Allergies Current Immunizations  Reviewed on 3/12/2018 Name Date Influenza High Dose Vaccine PF 11/27/2017, 9/30/2016 Influenza Vaccine (Quad) PF 12/2/2015  2:46 PM  
 Td 12/12/2009 Td, Adsorbed PF 7/5/2016 Not reviewed this visit You Were Diagnosed With   
  
 Codes Comments Initial Medicare annual wellness visit    -  Primary ICD-10-CM: Z00.00 ICD-9-CM: V70.0 Menopause     ICD-10-CM: Z78.0 ICD-9-CM: 627.2 Chronic fatigue     ICD-10-CM: R53.82 
ICD-9-CM: 780.79 Vitals BP Pulse Temp Resp Height(growth percentile) Weight(growth percentile) 124/70 (BP 1 Location: Left arm, BP Patient Position: Sitting) 96 98.6 °F (37 °C) (Tympanic) 16 4' 11\" (1.499 m) 112 lb (50.8 kg) SpO2 BMI OB Status Smoking Status 97% 22.62 kg/m2 Postmenopausal Never Smoker BMI and BSA Data Body Mass Index Body Surface Area  
 22.62 kg/m 2 1.45 m 2 Preferred Pharmacy Pharmacy Name Phone [id number] [address] [address], [address] [facility] [facility] [phone] Your Updated Medication List  
  
   
 This list is accurate as of 3/15/18  1:11 PM.  Always use your most recent med list.  
  
  
  
  
 CLARITIN 10 mg tablet Generic drug:  loratadine Take 10 mg by mouth daily. desipramine 10 mg tablet Commonly known as:  Jamie Schirmer Take 20 mg by mouth nightly. FETZIMA 20 mg capsule Generic drug:  levomilnacipran Take 20 mg by mouth daily. fluticasone 50 mcg/actuation nasal spray Commonly known as:  FLONASE  
USE 2 SPRAYS INTO BOTH NOSTRILS NIGHTLY KlonoPIN 0.5 mg tablet Generic drug:  clonazePAM  
Take 0.5 mg by mouth two (2) times a day. LEXAPRO 10 mg tablet Generic drug:  escitalopram oxalate Take 5 mg by mouth nightly. montelukast 10 mg tablet Commonly known as:  SINGULAIR  
1 tablet daily To-Do List   
 03/15/2018 Imaging:  DEXA BONE DENSITY STUDY AXIAL Patient Instructions Medicare Wellness Visit, Female The best way to improve and maintain good health is to have a healthy lifestyle by eating a well-balanced diet, exercising regularly, limiting alcohol and stopping smoking. Regular physical exams and screening tests are another way to keep healthy. Preventive exams provided by your health care provider can find health problems before they become diseases or illnesses. Preventive services including immunizations, screening tests, monitoring and exams can help you take care of your own health. Preventive services such as immunizations prevent serious infections. All people over age 72 should have a Pneumovax and a Prevnar-13 shot to prevent potentially life threatening infections with the pneumococcus bacteria, a common cause of pneumonia. These are once in a lifetime unless you and your provider decide differently. Next due: Prevnar- 13 first and then Pneumovax one year later All people over 65 should have a yearly influenza vaccine or \"flu\" shot. This does not prevent infection with cold viruses but has been proven to prevent hospitalization and death from influenza. Next due: due again in Fall Although Medicare part B \"regular Medicare\" currently only covers tetanus vaccination in the context of an injury, a tetanus vaccine (Tdap or Td) is recommended every 10 years. Tdap is generally given once in a lifetime for older adults. Next due: Tdap A shingles vaccine is recommended as you get older. Zostavax is a once in a lifetime vaccine given over age 61years of age. There is also a new shingles vaccine, Shingrix, that is now preferred over Zostavax. Shingrix (a 2-dose series) is recommended if you are over age 48years of age and you've never received a shingles vaccine. It is also recommended for revaccination if you've previously received Zostavax. The Shingles vaccines are not covered by Medicare part B. Next due: Shingrix once available Note, however, that both the Shingles vaccine and Tdap/Td are generally covered by secondary carriers. Please check your coverage and out of pocket expenses. Your pharmacy benefits may cover these vaccines so please check with your pharmacist.  Also consider contacting your local health department because it may stock these vaccines for a reasonable charge. We currently have documentation of the following immunization history for you: 
Immunization History Administered Date(s) Administered  Influenza High Dose Vaccine PF 09/30/2016, 11/27/2017  Influenza Vaccine (Quad) PF 12/02/2015  Td 12/12/2009  Td, Adsorbed PF 07/05/2016 A bone mass density test (DEXA) to screen for osteoporosis or thinning of the bones should be done at least once after age 72 and may be done up to every 2 years as determined by you and your health care provider. The most recent DEXA we have on file for you is: DEXA Results (most recent):  11/27/2012 osteopenia Next due: follow-up monitoring per Dr. Danice Sandhoff No results found for this or any previous visit. Screening for diabetes mellitus with a blood sugar test (glucose) should be done every year. If you have diabetes, this monitoring will be done more frequently (usually every 3-6 months) and will include A1C testing. The most recent blood glucose we have on file for you is:  
Lab Results Component Value Date/Time Glucose 79 03/02/2018 01:04 PM  
 
 
Glaucoma is a disease of the eye due to increased ocular pressure that can lead to blindness. People with risk factors for glaucoma ( race,  American race, diabetes, family history) should be screened every year by an eye professional.  
Last done: Fall 2017  Next due: every 6 months per Dr. Marilyn Reis (please have him send the report to Dr. Jackie Corley) Cardiovascular screening tests that check for elevated lipids or cholesterol (fatty part of blood) which can lead to heart disease and strokes should be done every 5 years. The most recent lipid panel we have on file for you is: No results found for: CHOL, CHOLPOCT, CHOLX, CHLST, CHOLV, HDL, HDLPOC, LDL, LDLCPOC, LDLC, DLDLP, VLDLC, VLDL, TGLX, TRIGL, TRIGP, TGLPOCT, CHHD, CHHDX Next due: with routine fasting labs Colorectal cancer screening that evaluates for blood or polyps in your colon for people with average risk should be done yearly as a stool test, every five years as a flexible sigmoidoscope or every 10 years as a colonoscopy up to age 76. You and your health care provider may decide whether to continue screening after age 76 or if you need to be screened more frequently. Last done:  5/1/2017 Next due: 2027 per Dr. Lisa Buitrago Breast cancer screening with a mammogram is recommended at least once every 2 years  for women age 54-69. You and your health care provider may decide whether to continue screening after age 76. The most recent mammogram we have on file for you is: No longer indicated based on surgical history Screening for cervical cancer with a pap smear and pelvic exam is recommended for all women with a cervix until age 72. The frequency of this test is based on the details of your prior pap smear testing (usually every 1 or 2 years - more often with increased risk of cervical cancer or positive family history). You and your health care provider may decide whether to continue screening after age 72. Next due: no longer routinely recommended unless otherwise advised Screening for infection with Hepatitis C is recommended for anyone born between [birth year] through [address]. People ages 54 to [de-identified] years who have smoked the equivalent of 1 pack per day for 30 years or more may benefit from screening for lung cancer with a yearly low dose CT scan until they have been non smokers for 15 years. Please ask your health care provider if you have any questions. Your Medicare Wellness Exam is recommended annually. If you do not have Advanced Directives on file with our office and you either have the completed document at home or you fill out the forms provided, please bring a copy to the office to be scanned into your record. Here is a list of your current Health Maintenance items with a due date: 
Health Maintenance Due Topic Date Due  
 Hepatitis C Test  1950  Breast Cancer Screening  08/01/2010  Shingles Vaccine  11/12/2010  Bone Mineral Density   01/12/2016  Pneumococcal Vaccine (1 of 2 - PCV13) 01/12/2016  Annual Well Visit  01/12/2016  
 DTaP/Tdap/Td  (1 - Tdap) 07/06/2016 Introducing \[facility]\"" & HEALTH SERVICES! [facility] introduces PROnoise patient portal. Now you can access parts of your medical record, email your doctor's office, and request medication refills online. 1. In your internet browser, go to [email or web address] 2. Click on the First Time User? Click Here link in the Sign In box. You will see the New Member Sign Up page. 3. Enter your KSE Access Code exactly as it appears below. You will not need to use this code after youve completed the sign-up process. If you do not sign up before the expiration date, you must request a new code. · KSE Access Code: [email or web address] Expires: 5/15/2018 12:23 PM 
 
4. Enter the last four digits of your Social Security Number (xxxx) and Date of Birth (mm/dd/yyyy) as indicated and click Submit. You will be taken to the next sign-up page. 5. Create a KSE ID. This will be your KSE login ID and cannot be changed, so think of one that is secure and easy to remember. 6. Create a KSE password. You can change your password at any time. 7. Enter your Password Reset Question and Answer. This can be used at a later time if you forget your password. 8. Enter your e-mail address. You will receive e-mail notification when new information is available in [address] 9. Click Sign Up. You can now view and download portions of your medical record. 10. Click the Download Summary menu link to download a portable copy of your medical information. If you have questions, please visit the Frequently Asked Questions section of the KSE website. Remember, KSE is NOT to be used for urgent needs. For medical emergencies, dial 911. Now available from your iPhone and Android! Please provide this summary of care documentation to your next provider. Your primary care clinician is listed as Tod Burt. If you have any questions after today's visit, please call [phone].

## 2018-03-15 NOTE — ACP (ADVANCE CARE PLANNING)
The patient was advised and counseled regarding advanced directives during Medicare AWV. The patient was provided with an information packet and an advanced directive form to complete and return a copy to the office to be scanned into chart.

## 2018-03-17 RX ORDER — MONTELUKAST SODIUM 10 MG/1
TABLET ORAL
Qty: 90 TAB | Refills: 0 | Status: SHIPPED | OUTPATIENT
Start: 2018-03-17 | End: 2018-06-30 | Stop reason: SDUPTHER

## 2018-03-19 NOTE — PROGRESS NOTES
The patient presents to the office today with the chief complaint of fatigue    HPI    The patient has asthma. Recently she had a respiratory infection. This was was difficult to clear but now it has resolved. The  patient remains quite fatigued. The patient is due for lipid testing as part of a cardiovascular risk assessment. Review of Systems   Respiratory: Negative for shortness of breath. Cardiovascular: Negative for chest pain and leg swelling. No Known Allergies    Current Outpatient Prescriptions   Medication Sig Dispense Refill    montelukast (SINGULAIR) 10 mg tablet TAKE 1 TABLET BY MOUTH DAILY 90 Tab 0    desipramine (NOPRAMIN) 10 mg tablet Take 20 mg by mouth nightly.  fluticasone (FLONASE) 50 mcg/actuation nasal spray USE 2 SPRAYS INTO BOTH NOSTRILS NIGHTLY 3 Bottle 3    montelukast (SINGULAIR) 10 mg tablet 1 tablet daily 90 Tab 3    levomilnacipran (FETZIMA) 20 mg capsule Take 20 mg by mouth daily.  loratadine (CLARITIN) 10 mg tablet Take 10 mg by mouth daily.  escitalopram (LEXAPRO) 10 mg tablet Take 5 mg by mouth nightly.  clonazePAM (KLONOPIN) 0.5 mg tablet Take 0.5 mg by mouth two (2) times a day.          Past Medical History:   Diagnosis Date    Acute bronchitis     Acute upper respiratory infection     Asthma     [facility] [address]     Headache     Low back pain     Other ill-defined conditions(799.89)     cataracts    Psychiatric disorder     anxiety issues    Vision decreased     lens implants from cataracts       Past Surgical History:   Procedure Laterality Date    BREAST SURGERY PROCEDURE UNLISTED Bilateral 9/2008    mastectomy with reconstruction    HX BREAST RECONSTRUCTION  10/05/2017    bilateral breast implants with Dr. Basim Wen HX COLONOSCOPY  12/2012    neg    HX COLONOSCOPY  05/01/2017    Dr. Tania Damon, repeat in 10 years    HX HEENT Bilateral 2012    cataracts, left in June, right in July    HX KNEE ARTHROSCOPY Bilateral     HX OTHER SURGICAL      sinus surgery    HX TONSILLECTOMY         Social History     Social History    Marital status:      Spouse name: N/A    Number of children: N/A    Years of education: N/A     Occupational History    Not on file. Social History Main Topics    Smoking status: Never Smoker    Smokeless tobacco: Never Used    Alcohol use No    Drug use: No    Sexual activity: Not Currently     Other Topics Concern    Not on file     Social History Narrative       Patient does not have an advanced directive on file    Visit Vitals    /70 (BP 1 Location: Left arm, BP Patient Position: Sitting)    Pulse 96    Temp 98.6 °F (37 °C) (Tympanic)    Resp 16    Ht 4' 11\" (1.499 m)    Wt 112 lb (50.8 kg)    SpO2 97%    BMI 22.62 kg/m2       Physical Exam   No Cervical Lymphadenopathy  No Supraclavicular Lymphadenopathy  Thyroid is Normal  Lungs are normal to percussion. Clear to auscultation   Heart:  S1 S2 are normal, No gallops, No mummers  No Carotid Bruits  Abdomen:  Normal Bowel Sounds. No tenderness. No masses. No Hepatomegaly or Splenomegly  LE:  Strong Pedal Pulses.   No Edema      BMI:  [facility] Outpatient Visit on 03/15/2018   Component Date Value Ref Range Status    Cholesterol, total 03/15/2018 202* <200 MG/DL Final    TSH 03/15/2018 1.94  0.36 - 3.74 uIU/mL Final   Hospital Outpatient Visit on 03/02/2018   Component Date Value Ref Range Status    WBC 03/02/2018 6.3  4.6 - 13.2 K/uL Final    RBC 03/02/2018 5.06  4.20 - 5.30 M/uL Final    HGB 03/02/2018 12.9  12.0 - 16.0 g/dL Final    HCT 03/02/2018 40.9  35.0 - 45.0 % Final    MCV 03/02/2018 80.8  74.0 - 97.0 FL Final    MCH 03/02/2018 25.5  24.0 - 34.0 PG Final    MCHC 03/02/2018 31.5  31.0 - 37.0 g/dL Final    RDW 03/02/2018 13.3  11.6 - 14.5 % Final    PLATELET 68/02/3420 205  135 - 420 K/uL Final    MPV 03/02/2018 11.2  9.2 - 11.8 FL Final    NEUTROPHILS 03/02/2018 60  40 - 73 % Final    LYMPHOCYTES 03/02/2018 32  21 - 52 % Final    MONOCYTES 03/02/2018 7  3 - 10 % Final    EOSINOPHILS 03/02/2018 1  0 - 5 % Final    BASOPHILS 03/02/2018 0  0 - 2 % Final    ABS. NEUTROPHILS 03/02/2018 3.8  1.8 - 8.0 K/UL Final    ABS. LYMPHOCYTES 03/02/2018 2.0  0.9 - 3.6 K/UL Final    ABS. MONOCYTES 03/02/2018 0.4  0.05 - 1.2 K/UL Final    ABS. EOSINOPHILS 03/02/2018 0.1  0.0 - 0.4 K/UL Final    ABS. BASOPHILS 03/02/2018 0.0  0.0 - 0.1 K/UL Final    DF 03/02/2018 AUTOMATED    Final    Sodium 03/02/2018 137  136 - 145 mmol/L Final    Potassium 03/02/2018 4.7  3.5 - 5.5 mmol/L Final    Chloride 03/02/2018 100  100 - 108 mmol/L Final    CO2 03/02/2018 30  21 - 32 mmol/L Final    Anion gap 03/02/2018 7  3.0 - 18 mmol/L Final    Glucose 03/02/2018 79  74 - 99 mg/dL Final    BUN 03/02/2018 13  7.0 - 18 MG/DL Final    Creatinine 03/02/2018 0.76  0.6 - 1.3 MG/DL Final    BUN/Creatinine ratio 03/02/2018 17  12 - 20   Final    GFR est AA 03/02/2018 >60  >60 ml/min/1.73m2 Final    GFR est non-AA 03/02/2018 >60  >60 ml/min/1.73m2 Final    Comment: (NOTE)  Estimated GFR is calculated using the Modification of Diet in Renal   Disease (MDRD) Study equation, reported for both  Americans   (GFRAA) and non- Americans (GFRNA), and normalized to 1.73m2   body surface area. The physician must decide which value applies to   the patient. The MDRD study equation should only be used in   individuals age 25 or older. It has not been validated for the   following: pregnant women, patients with serious comorbid conditions,   or on certain medications, or persons with extremes of body size,   muscle mass, or nutritional status.  Calcium 03/02/2018 8.7  8.5 - 10.1 MG/DL Final    Bilirubin, total 03/02/2018 0.6  0.2 - 1.0 MG/DL Final    ALT (SGPT) 03/02/2018 17  13 - 56 U/L Final    AST (SGOT) 03/02/2018 24  15 - 37 U/L Final    Alk.  phosphatase 03/02/2018 83  45 - 117 U/L Final    Protein, total 03/02/2018 6.9 6.4 - 8.2 g/dL Final    Albumin 03/02/2018 4.1  3.4 - 5.0 g/dL Final    Globulin 03/02/2018 2.8  2.0 - 4.0 g/dL Final    A-G Ratio 03/02/2018 1.5  0.8 - 1.7   Final       .  Results for orders placed or performed during the hospital encounter of 03/15/18   CHOLESTEROL, TOTAL   Result Value Ref Range    Cholesterol, total 202 (H) <200 MG/DL   TSH 3RD GENERATION   Result Value Ref Range    TSH 1.94 0.36 - 3.74 uIU/mL       Assessment / Plan      ICD-10-CM ICD-9-CM    1. Initial Medicare annual wellness visit Z00.00 V70.0 desipramine (NOPRAMIN) 10 mg tablet      DEXA BONE DENSITY STUDY AXIAL      TSH 3RD GENERATION      CHOLESTEROL, TOTAL      TSH 3RD GENERATION   2. Menopause Z78.0 627.2 desipramine (NOPRAMIN) 10 mg tablet      DEXA BONE DENSITY STUDY AXIAL      TSH 3RD GENERATION      CHOLESTEROL, TOTAL      TSH 3RD GENERATION   3. Chronic fatigue R53.82 780.79 desipramine (NOPRAMIN) 10 mg tablet      DEXA BONE DENSITY STUDY AXIAL      TSH 3RD GENERATION      CHOLESTEROL, TOTAL      TSH 3RD GENERATION   4. Encounter for lipid screening for cardiovascular disease Z13.220 V77.91 desipramine (NOPRAMIN) 10 mg tablet    Z13.6 V81.2 DEXA BONE DENSITY STUDY AXIAL      TSH 3RD GENERATION      CHOLESTEROL, TOTAL      TSH 3RD GENERATION       DEXA Scan  Labs  she was advised to continue her maintenance medications      Follow-up Disposition:  Return in about 5 months (around 8/15/2018). I asked Renetta Winter if she has any questions and I answered the questions.   Renetta Winter states that she understands the treatment plan and agrees with the treatment plan

## 2018-04-17 ENCOUNTER — HOSPITAL ENCOUNTER (OUTPATIENT)
Dept: BONE DENSITY | Age: 68
Discharge: HOME | End: 2018-04-17
Attending: INTERNAL MEDICINE
Payer: MEDICARE

## 2018-04-17 DIAGNOSIS — Z00.00 INITIAL MEDICARE ANNUAL WELLNESS VISIT: ICD-10-CM

## 2018-04-17 DIAGNOSIS — R53.82 CHRONIC FATIGUE: ICD-10-CM

## 2018-04-17 DIAGNOSIS — Z78.0 MENOPAUSE: ICD-10-CM

## 2018-04-17 DIAGNOSIS — Z13.220 ENCOUNTER FOR LIPID SCREENING FOR CARDIOVASCULAR DISEASE: ICD-10-CM

## 2018-04-17 DIAGNOSIS — Z13.6 ENCOUNTER FOR LIPID SCREENING FOR CARDIOVASCULAR DISEASE: ICD-10-CM

## 2018-04-17 PROCEDURE — 77080 DXA BONE DENSITY AXIAL: CPT

## 2018-06-10 RX ORDER — FLUTICASONE PROPIONATE 50 MCG
SPRAY, SUSPENSION (ML) NASAL
Qty: 1 BOTTLE | Refills: 2 | Status: SHIPPED | OUTPATIENT
Start: 2018-06-10 | End: 2018-06-13 | Stop reason: SDUPTHER

## 2018-06-13 RX ORDER — FLUTICASONE PROPIONATE 50 MCG
SPRAY, SUSPENSION (ML) NASAL
Qty: 3 BOTTLE | Refills: 1 | Status: SHIPPED | OUTPATIENT
Start: 2018-06-13 | End: 2018-11-05 | Stop reason: SDUPTHER

## 2018-06-13 NOTE — TELEPHONE ENCOUNTER
Requested Prescriptions     Pending Prescriptions Disp Refills    fluticasone (FLONASE) 50 mcg/actuation nasal spray 3 Bottle 1     Sig: SQUIRT 2 SPRAYS IN BOTH NOSTRILS NIGHTLY     Pharmacy is requesting 90 day supply

## 2018-07-03 RX ORDER — MONTELUKAST SODIUM 10 MG/1
TABLET ORAL
Qty: 90 TAB | Refills: 0 | Status: SHIPPED | OUTPATIENT
Start: 2018-07-03 | End: 2018-07-11 | Stop reason: SDUPTHER

## 2018-07-11 RX ORDER — MONTELUKAST SODIUM 10 MG/1
TABLET ORAL
Qty: 90 TAB | Refills: 0 | Status: SHIPPED | OUTPATIENT
Start: 2018-07-11 | End: 2018-10-05 | Stop reason: SDUPTHER

## 2018-07-11 NOTE — TELEPHONE ENCOUNTER
Requested Prescriptions     Pending Prescriptions Disp Refills    montelukast (SINGULAIR) 10 mg tablet 90 Tab 0

## 2018-09-25 ENCOUNTER — TELEPHONE (OUTPATIENT)
Dept: INTERNAL MEDICINE CLINIC | Age: 68
End: 2018-09-25

## 2018-09-25 NOTE — TELEPHONE ENCOUNTER
Patient would like someone to call her with her bone density results from April. Please call her at [phone].

## 2018-09-26 NOTE — TELEPHONE ENCOUNTER
Patient was notified that Dr. Charly Hinson is out of town until Monday and we will call her back with the results. Patient verbally understands.

## 2018-09-27 NOTE — TELEPHONE ENCOUNTER
Patient was notified that I will speak with Dr. Juan Miguel Victoria on Monday regarding her Bone Density study, In the meantime patient stated that she has a rash on back and shoulders and would like something for it. Patient has an appt to F/U on Bone Density on Nov. 1 but Was given an appt. On Oct 2 to have the rash checked out. Dr. Juan Miguel Victoria can address the test at this appt.

## 2018-10-02 ENCOUNTER — TELEPHONE (OUTPATIENT)
Dept: INTERNAL MEDICINE CLINIC | Age: 68
End: 2018-10-02

## 2018-10-05 RX ORDER — MONTELUKAST SODIUM 10 MG/1
TABLET ORAL
Qty: 90 TAB | Refills: 0 | Status: SHIPPED | OUTPATIENT
Start: 2018-10-05 | End: 2018-11-30 | Stop reason: SDUPTHER

## 2018-10-05 NOTE — TELEPHONE ENCOUNTER
Requested Prescriptions     Pending Prescriptions Disp Refills    montelukast (SINGULAIR) 10 mg tablet 90 Tab 0     Sig: TAKE 1 TABLET BY MOUTH ONCE DAILY

## 2018-10-09 RX ORDER — MONTELUKAST SODIUM 10 MG/1
TABLET ORAL
Qty: 90 TAB | Refills: 0 | Status: SHIPPED | OUTPATIENT
Start: 2018-10-09 | End: 2018-11-30 | Stop reason: SDUPTHER

## 2018-10-09 RX ORDER — ALENDRONATE SODIUM 70 MG/1
TABLET ORAL
Qty: 12 TAB | Refills: 1 | Status: SHIPPED | OUTPATIENT
Start: 2018-10-09 | End: 2019-04-12 | Stop reason: ALTCHOICE

## 2018-11-05 ENCOUNTER — CLINICAL SUPPORT (OUTPATIENT)
Dept: INTERNAL MEDICINE CLINIC | Age: 68
End: 2018-11-05

## 2018-11-05 DIAGNOSIS — Z23 ENCOUNTER FOR IMMUNIZATION: ICD-10-CM

## 2018-11-05 RX ORDER — FLUTICASONE PROPIONATE 50 MCG
SPRAY, SUSPENSION (ML) NASAL
Qty: 1 BOTTLE | Refills: 1 | Status: SHIPPED | OUTPATIENT
Start: 2018-11-05 | End: 2019-01-09 | Stop reason: SDUPTHER

## 2018-11-05 NOTE — PROGRESS NOTES
Patient presented to office for influenza vaccine  injection. Allergies noted. Patient well and consenting to injection. Injection given intramuscular in left  deltoid. Patient tolerated injection well and left office ambulatory.

## 2018-11-30 ENCOUNTER — OFFICE VISIT (OUTPATIENT)
Dept: INTERNAL MEDICINE CLINIC | Age: 68
End: 2018-11-30

## 2018-11-30 VITALS
BODY MASS INDEX: 24.6 KG/M2 | TEMPERATURE: 98.3 F | HEIGHT: 59 IN | SYSTOLIC BLOOD PRESSURE: 138 MMHG | WEIGHT: 122 LBS | HEART RATE: 100 BPM | OXYGEN SATURATION: 99 % | DIASTOLIC BLOOD PRESSURE: 82 MMHG

## 2018-11-30 DIAGNOSIS — J45.40 MODERATE PERSISTENT REACTIVE AIRWAY DISEASE WITHOUT COMPLICATION: ICD-10-CM

## 2018-11-30 DIAGNOSIS — M81.0 AGE-RELATED OSTEOPOROSIS WITHOUT CURRENT PATHOLOGICAL FRACTURE: Primary | ICD-10-CM

## 2018-11-30 DIAGNOSIS — Z23 ENCOUNTER FOR IMMUNIZATION: ICD-10-CM

## 2018-11-30 RX ORDER — LEVOMILNACIPRAN HYDROCHLORIDE 20 MG/1
CAPSULE, EXTENDED RELEASE ORAL
Refills: 12 | COMMUNITY
Start: 2018-11-05 | End: 2018-11-30 | Stop reason: SDUPTHER

## 2018-11-30 RX ORDER — LATANOPROST 50 UG/ML
SOLUTION/ DROPS OPHTHALMIC
COMMUNITY
End: 2019-04-12 | Stop reason: ALTCHOICE

## 2018-11-30 RX ORDER — DESIPRAMINE HYDROCHLORIDE 10 MG/1
TABLET ORAL
COMMUNITY
Start: 2018-11-22 | End: 2018-11-30 | Stop reason: SDUPTHER

## 2018-11-30 RX ORDER — CLONAZEPAM 0.5 MG/1
0.5 TABLET ORAL
COMMUNITY
End: 2018-11-30 | Stop reason: SDUPTHER

## 2018-11-30 RX ORDER — DESIPRAMINE HYDROCHLORIDE 25 MG/1
20 TABLET ORAL
COMMUNITY
End: 2018-11-30 | Stop reason: SDUPTHER

## 2018-11-30 RX ORDER — MONTELUKAST SODIUM 10 MG/1
10 TABLET ORAL
COMMUNITY
End: 2018-11-30 | Stop reason: SDUPTHER

## 2018-11-30 RX ORDER — LATANOPROST 50 UG/ML
SOLUTION/ DROPS OPHTHALMIC
COMMUNITY
Start: 2018-11-29 | End: 2018-11-30 | Stop reason: SDUPTHER

## 2018-11-30 RX ORDER — MECLIZINE HCL 12.5 MG 12.5 MG/1
12.5 TABLET ORAL
COMMUNITY
End: 2018-11-30 | Stop reason: SDUPTHER

## 2018-11-30 RX ORDER — LORATADINE 10 MG/1
10 TABLET ORAL
COMMUNITY
End: 2018-11-30 | Stop reason: SDUPTHER

## 2018-11-30 RX ORDER — FLUTICASONE PROPIONATE 50 MCG
2 SPRAY, SUSPENSION (ML) NASAL
COMMUNITY
End: 2018-11-30 | Stop reason: SDUPTHER

## 2018-11-30 RX ORDER — ESCITALOPRAM OXALATE 5 MG/1
5 TABLET ORAL
COMMUNITY
End: 2018-11-30 | Stop reason: SDUPTHER

## 2018-11-30 RX ORDER — ESCITALOPRAM OXALATE 10 MG/1
TABLET ORAL
Refills: 12 | COMMUNITY
Start: 2018-11-05

## 2018-11-30 NOTE — PROGRESS NOTES
Chief Complaint   Patient presents with    Well Woman       Depression Screening:  PHQ over the last two weeks 3/15/2018   Little interest or pleasure in doing things Not at all   Feeling down, depressed, irritable, or hopeless Not at all   Total Score PHQ 2 0       Learning Assessment:  Learning Assessment 12/7/2015   PRIMARY LEARNER Patient   HIGHEST LEVEL OF EDUCATION - PRIMARY LEARNER  [facility] PRIMARY LEARNER NONE   CO-LEARNER CAREGIVER No   PRIMARY LANGUAGE ENGLISH    NEED No   LEARNER PREFERENCE PRIMARY DEMONSTRATION   ANSWERED BY patient   RELATIONSHIP SELF       Abuse Screening:  Abuse Screening Questionnaire 3/15/2018   Do you ever feel afraid of your partner? N   Are you in a relationship with someone who physically or mentally threatens you? N   Is it safe for you to go home? Y       Fall Risk  Fall Risk Assessment, last 12 mths 3/15/2018   Able to walk? Yes   Fall in past 12 months? No           1. Have you been to the ER, urgent care clinic since your last visit? Hospitalized since your last visit? No    2. Have you seen or consulted any other health care providers outside of the [address] [place] [address] since your last visit? Include any pap smears or colon screening.  No

## 2018-12-01 NOTE — PROGRESS NOTES
The patient presents to the office today with the chief complaint of osteoporosis    HPI    The patient has been found to have osteoporosis. There is no hx of fractures. The patient remains on medications for asthma. She is doing well. Review of Systems   Respiratory: Negative for shortness of breath. Cardiovascular: Negative for chest pain and leg swelling. No Known Allergies    Current Outpatient Medications   Medication Sig Dispense Refill    propylene glycol 0.6 % drop Instill 1 Drop in eye 3 Times Daily.  latanoprost (XALATAN) 0.005 % ophthalmic solution Instill 1 Drop in both eyes Every Night at Bedtime.  escitalopram oxalate (LEXAPRO) 10 mg tablet TAKE 1/2 TO 1 TABLET BY MOUTH EVERY DAY  12    varicella-zoster recombinant, PF, (SHINGRIX, PF,) 50 mcg/0.5 mL susr injection Inject 0.5ml  Im first dose, then repeat in three months 0.5 mL 1    fluticasone (FLONASE) 50 mcg/actuation nasal spray SQUIRT 2 SPRAYS IN BOTH NOSTRILS NIGHTLY 1 Bottle 1    alendronate (FOSAMAX) 70 mg tablet Take 1 po q week ( every 7 days) 12 Tab 1    montelukast (SINGULAIR) 10 mg tablet 1 tablet daily 90 Tab 3    loratadine (CLARITIN) 10 mg tablet Take 10 mg by mouth daily.  clonazePAM (KLONOPIN) 0.5 mg tablet Take 0.5 mg by mouth two (2) times a day.          Past Medical History:   Diagnosis Date    Acute bronchitis     Acute upper respiratory infection     Asthma     Cancer ([facility] [address]     Headache     Low back pain     Other ill-defined conditions(799.89)     cataracts    Psychiatric disorder     anxiety issues    Vision decreased     lens implants from cataracts       Past Surgical History:   Procedure Laterality Date    BREAST SURGERY PROCEDURE UNLISTED Bilateral 9/2008    mastectomy with reconstruction    HX BREAST RECONSTRUCTION  10/05/2017    bilateral breast implants with Dr. Denise Segura HX COLONOSCOPY  12/2012    neg    HX COLONOSCOPY  05/01/2017    Dr. Marivel Valdez, repeat in 10 years  HX HEENT Bilateral 2012    cataracts, left in June, right in July    HX KNEE ARTHROSCOPY Bilateral     HX OTHER SURGICAL      sinus surgery    HX TONSILLECTOMY         Social History     Socioeconomic History    Marital status:      Spouse name: Not on file    Number of children: Not on file    Years of education: Not on file    Highest education level: Not on file   Social Needs    Financial resource strain: Not on file    Food insecurity - worry: Not on file    Food insecurity - inability: Not on file    Transportation needs - medical: Not on file   zoidu needs - non-medical: Not on file   Occupational History    Not on file   Tobacco Use    Smoking status: Never Smoker    Smokeless tobacco: Never Used   Substance and Sexual Activity    Alcohol use: No    Drug use: No    Sexual activity: Not Currently   Other Topics Concern    Not on file   Social History Narrative    Not on file       Patient does not have an advanced directive on file    Visit Vitals  /82 (BP 1 Location: Left arm, BP Patient Position: Sitting)   Pulse 100   Temp 98.3 °F (36.8 °C) (Tympanic)   Ht 4' 11\" (1.499 m)   Wt 122 lb (55.3 kg)   SpO2 99%   BMI 24.64 kg/m²       Physical Exam    BMI:  Ok    No visits with results within 3 Month(s) from this visit. Latest known visit with results is:   Hospital Outpatient Visit on 03/15/2018   Component Date Value Ref Range Status    Cholesterol, total 03/15/2018 202* <200 MG/DL Final    TSH 03/15/2018 1.94  0.36 - 3.74 uIU/mL Final       .No results found for any visits on 11/30/18. Assessment / Plan      ICD-10-CM ICD-9-CM    1. Age-related osteoporosis without current pathological fracture M81.0 733.01 CBC WITH AUTOMATED DIFF      METABOLIC PANEL, COMPREHENSIVE   2. Moderate persistent reactive airway disease without complication I74.06 006.38 CBC WITH AUTOMATED DIFF   3.  Encounter for immunization Z23 V03.89 [facility] PNEUMOCOCCAL CONJ VACCINE 13 VALENT IM       Labs ordered  Fosamax prescribed  she was advised to continue her maintenance medications  Prevnar 13 given    Follow-up Disposition:  Return in 6 months (on 5/30/2019). I asked Jeremias Worley if she has any questions and I answered the questions.   Jeremias Worley states that she understands the treatment plan and agrees with the treatment plan

## 2019-01-03 RX ORDER — MONTELUKAST SODIUM 10 MG/1
TABLET ORAL
Qty: 90 TAB | Refills: 0 | Status: SHIPPED | OUTPATIENT
Start: 2019-01-03 | End: 2019-03-15 | Stop reason: SDUPTHER

## 2019-01-09 NOTE — TELEPHONE ENCOUNTER
Requested Prescriptions     Pending Prescriptions Disp Refills    fluticasone (FLONASE) 50 mcg/actuation nasal spray 1 Bottle 1

## 2019-01-10 RX ORDER — FLUTICASONE PROPIONATE 50 MCG
SPRAY, SUSPENSION (ML) NASAL
Qty: 3 BOTTLE | Refills: 1 | Status: SHIPPED | OUTPATIENT
Start: 2019-01-10 | End: 2019-03-07 | Stop reason: SDUPTHER

## 2019-03-07 RX ORDER — FLUTICASONE PROPIONATE 50 MCG
SPRAY, SUSPENSION (ML) NASAL
Qty: 3 BOTTLE | Refills: 1 | Status: SHIPPED | OUTPATIENT
Start: 2019-03-07 | End: 2019-03-20 | Stop reason: SDUPTHER

## 2019-03-07 NOTE — TELEPHONE ENCOUNTER
Requested Prescriptions     Pending Prescriptions Disp Refills    fluticasone (FLONASE) 50 mcg/actuation nasal spray 3 Bottle 1     Si sprays up each nostril daily

## 2019-03-15 RX ORDER — MONTELUKAST SODIUM 10 MG/1
TABLET ORAL
Qty: 90 TAB | Refills: 0 | Status: SHIPPED | OUTPATIENT
Start: 2019-03-15 | End: 2019-09-21 | Stop reason: SDUPTHER

## 2019-03-20 RX ORDER — FLUTICASONE PROPIONATE 50 MCG
SPRAY, SUSPENSION (ML) NASAL
Qty: 1 BOTTLE | Refills: 3 | Status: SHIPPED | OUTPATIENT
Start: 2019-03-20 | End: 2019-11-19 | Stop reason: SDUPTHER

## 2019-04-12 ENCOUNTER — OFFICE VISIT (OUTPATIENT)
Dept: FAMILY MEDICINE CLINIC | Facility: CLINIC | Age: 69
End: 2019-04-12

## 2019-04-12 VITALS
SYSTOLIC BLOOD PRESSURE: 122 MMHG | OXYGEN SATURATION: 99 % | HEART RATE: 95 BPM | HEIGHT: 59 IN | BODY MASS INDEX: 22.78 KG/M2 | WEIGHT: 113 LBS | DIASTOLIC BLOOD PRESSURE: 78 MMHG | TEMPERATURE: 97.3 F | RESPIRATION RATE: 18 BRPM

## 2019-04-12 DIAGNOSIS — F41.9 ANXIETY: ICD-10-CM

## 2019-04-12 DIAGNOSIS — N39.46 MIXED STRESS AND URGE URINARY INCONTINENCE: ICD-10-CM

## 2019-04-12 DIAGNOSIS — Z85.3 HX: BREAST CANCER: ICD-10-CM

## 2019-04-12 DIAGNOSIS — J45.40 MODERATE PERSISTENT REACTIVE AIRWAY DISEASE WITHOUT COMPLICATION: ICD-10-CM

## 2019-04-12 DIAGNOSIS — Z01.818 PRE-OP EVALUATION: Primary | ICD-10-CM

## 2019-04-12 RX ORDER — DESLORATADINE 5 MG/1
TABLET ORAL
Qty: 30 TAB | Refills: 3 | Status: SHIPPED | OUTPATIENT
Start: 2019-04-12 | End: 2019-05-16 | Stop reason: ALTCHOICE

## 2019-04-12 NOTE — PROGRESS NOTES
The patient presents to the office today with the chief complaint of double vision and for a pre op evaluation HPI Joann Sin complains of double vision. she is now scheduled for surgical correction. The patient has reactive airway disease. The patient has intermittent wheezing. This is currently quiet. The patient does have some problems with sinus drainage from the pollen. The patient remains anxious. This is doing ok on anxiolytics. The patient has a history of breast cancer. She is status post bilateral mastectomies with several reconstructions. There is no evidence of reoccurrence. Review of Systems Cardiovascular: Negative for chest pain and leg swelling. Genitourinary:  
     Urinary incontinence No Known Allergies Current Outpatient Medications Medication Sig Dispense Refill  desloratadine (CLARINEX) 5 mg tablet 1 tab daily 30 Tab 3  
 fluticasone propionate (FLONASE) 50 mcg/actuation nasal spray INSTILL 2 SPRAYS INTO BOTH NOSTRILS NIGHTLY. 1 Bottle 3  
 montelukast (SINGULAIR) 10 mg tablet TAKE 1 TABLET BY MOUTH EVERY DAY 90 Tab 0  propylene glycol 0.6 % drop Instill 1 Drop in eye 3 Times Daily.  escitalopram oxalate (LEXAPRO) 10 mg tablet TAKE 1/2 TO 1 TABLET BY MOUTH EVERY DAY  12  
 loratadine (CLARITIN) 10 mg tablet Take 10 mg by mouth daily.  clonazePAM (KLONOPIN) 0.5 mg tablet Take 0.5 mg by mouth two (2) times a day. Past Medical History:  
Diagnosis Date  Acute bronchitis  Acute upper respiratory infection  Asthma  Cancer ([facility] [address]  Headache  Low back pain  Other ill-defined conditions(799.89)   
 cataracts  Psychiatric disorder   
 anxiety issues  Vision decreased   
 lens implants from cataracts Past Surgical History:  
Procedure Laterality Date  BREAST SURGERY PROCEDURE UNLISTED Bilateral 9/2008  
 mastectomy with reconstruction  HX BREAST RECONSTRUCTION  10/05/2017 bilateral breast implants with Dr. Sherice Andrews  HX COLONOSCOPY  12/2012  
 neg  HX COLONOSCOPY  05/01/2017 Dr. Elis Broussard, repeat in 10 years  HX HEENT Bilateral 2012  
 cataracts, left in June, right in July  HX KNEE ARTHROSCOPY Bilateral   
 HX OTHER SURGICAL    
 sinus surgery  HX TONSILLECTOMY Social History Socioeconomic History  Marital status:  Spouse name: Not on file  Number of children: Not on file  Years of education: Not on file  Highest education level: Not on file Occupational History  Not on file Social Needs  Financial resource strain: Not on file  Food insecurity:  
  Worry: Not on file Inability: Not on file  Transportation needs:  
  Medical: Not on file Non-medical: Not on file Tobacco Use  Smoking status: Never Smoker  Smokeless tobacco: Never Used Substance and Sexual Activity  Alcohol use: No  
 Drug use: No  
 Sexual activity: Not Currently Lifestyle  Physical activity:  
  Days per week: Not on file Minutes per session: Not on file  Stress: Not on file Relationships  Social connections:  
  Talks on phone: Not on file Gets together: Not on file Attends Voodoo service: Not on file Active member of club or organization: Not on file Attends meetings of clubs or organizations: Not on file Relationship status: Not on file  Intimate partner violence:  
  Fear of current or ex partner: Not on file Emotionally abused: Not on file Physically abused: Not on file Forced sexual activity: Not on file Other Topics Concern  Not on file Social History Narrative  Not on file Patient does not have an advanced directive on file Visit Vitals /78 Pulse 95 Temp 97.3 °F (36.3 °C) (Tympanic) Resp 18 Ht 4' 11\" (1.499 m) Wt 113 lb (51.3 kg) SpO2 99% BMI 22.82 kg/m² Physical Exam  
No Cervical Lymphadenopathy No Supraclavicular Lymphadenopathy Thyroid is Normal 
Lungs are normal to percussion. Clear to auscultation Heart:  S1 S2 are normal, No gallops, No murmurs No Carotid Bruits Abdomen:  Normal Bowel Sounds. No tenderness. No masses. No Hepatomegaly or Splenomegaly LE:  Strong Pedal Pulses. No Edema BMI:  OK No visits with results within 3 Month(s) from this visit. Latest known visit with results is:  
Hospital Outpatient Visit on 03/15/2018 Component Date Value Ref Range Status  Cholesterol, total 03/15/2018 202* <200 MG/DL Final  
 TSH 03/15/2018 1.94  0.36 - 3.74 uIU/mL Final  
 
 
.No results found for any visits on 04/12/19. Assessment / Plan ICD-10-CM ICD-9-CM 1. Pre-op evaluation Z01.818 V72.84   
2. Moderate persistent reactive airway disease without complication Y36.68 634.37 3. HX: breast cancer Z85.3 V10.3 4. Anxiety F41.9 300.00   
5. Mixed stress and urge urinary incontinence N39.46 788.33   
 
 
THE PATIENT IS OK FOR SURGERY Add Claritin for allergies 
she was advised to continue her maintenance medications Follow-up and Dispositions · Return in about 3 months (around 7/12/2019). I asked Ann Cottrell if she has any questions and I answered the questions. Ann Cottrell states that she understands the treatment plan and agrees with the treatment plan

## 2019-05-01 RX ORDER — ALENDRONATE SODIUM 70 MG/1
TABLET ORAL
Qty: 12 TAB | Refills: 1 | Status: SHIPPED | OUTPATIENT
Start: 2019-05-01 | End: 2019-08-11 | Stop reason: ALTCHOICE

## 2019-05-16 ENCOUNTER — OFFICE VISIT (OUTPATIENT)
Dept: FAMILY MEDICINE CLINIC | Facility: CLINIC | Age: 69
End: 2019-05-16

## 2019-05-16 VITALS
OXYGEN SATURATION: 97 % | SYSTOLIC BLOOD PRESSURE: 123 MMHG | HEIGHT: 59 IN | WEIGHT: 113 LBS | DIASTOLIC BLOOD PRESSURE: 82 MMHG | HEART RATE: 65 BPM | TEMPERATURE: 98.3 F | RESPIRATION RATE: 16 BRPM | BODY MASS INDEX: 22.78 KG/M2
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
## 2019-05-16 DIAGNOSIS — J30.89 NON-SEASONAL ALLERGIC RHINITIS, UNSPECIFIED TRIGGER: ICD-10-CM

## 2019-05-16 DIAGNOSIS — Z01.818 PRE-OP EVALUATION: Primary | ICD-10-CM

## 2019-05-16 DIAGNOSIS — H92.01 RIGHT EAR PAIN: ICD-10-CM

## 2019-05-16 RX ORDER — AMOXICILLIN AND CLAVULANATE POTASSIUM 875; 125 MG/1; MG/1
TABLET, FILM COATED ORAL
Qty: 14 TAB | Refills: 0 | Status: SHIPPED | OUTPATIENT
Start: 2019-05-16 | End: 2019-08-11 | Stop reason: ALTCHOICE

## 2019-05-16 NOTE — PROGRESS NOTES
The patient presents to the office today with the chief complaint of a tick bite HPI The patient found embedded tick in her left thigh approximately 5 days ago. The tick has been removed by the patient. It appears to have come out in total.  The patient denies fever or rash. The patient has chronic fatigue. This may been more of a problem as of late. Patient also complains of sinus congestion with mild facial fullness on the right. There is pain in the right ear. There is pain coming down the right side of her neck. Patient is using Flonase but does not feel that this is penetrating as well as it should be. The patient is status post bilateral mastectomies. Years ago she had reconstructive surgery which was not successful. Patient is now status post a second procedure done approximately 6 months ago. This is done well but there is one area that needs revision. ROS Positive for sinus congestion, ear pain, and fatigue Negative for chest pain or dyspnea No Known Allergies Current Outpatient Medications Medication Sig Dispense Refill  latanoprostene bunod (VYZULTA) 0.024 % drop Apply  to eye.  fluticasone propionate (FLONASE) 50 mcg/actuation nasal spray INSTILL 2 SPRAYS INTO BOTH NOSTRILS NIGHTLY. 1 Bottle 3  
 montelukast (SINGULAIR) 10 mg tablet TAKE 1 TABLET BY MOUTH EVERY DAY 90 Tab 0  
 escitalopram oxalate (LEXAPRO) 10 mg tablet TAKE 1/2 TO 1 TABLET BY MOUTH EVERY DAY  12  
 loratadine (CLARITIN) 10 mg tablet Take 10 mg by mouth daily.  clonazePAM (KLONOPIN) 0.5 mg tablet Take 0.5 mg by mouth two (2) times a day.  alendronate (FOSAMAX) 70 mg tablet TAKE 1 TABLET WEEKLY 12 Tab 1 Past Medical History:  
Diagnosis Date  Acute bronchitis  Acute upper respiratory infection  Asthma  Cancer ([facility] [address]  Headache  Low back pain  Other ill-defined conditions(249.43)   
 cataracts  Psychiatric disorder   
 anxiety issues  Vision decreased   
 lens implants from cataracts Past Surgical History:  
Procedure Laterality Date  BREAST SURGERY PROCEDURE UNLISTED Bilateral 9/2008  
 mastectomy with reconstruction  HX BREAST RECONSTRUCTION  10/05/2017  
 bilateral breast implants with Dr. Tammy Pritchard  HX COLONOSCOPY  12/2012  
 neg  HX COLONOSCOPY  05/01/2017 Dr. Wilton Monroe, repeat in 10 years  HX HEENT Bilateral 2012  
 cataracts, left in June, right in July  HX KNEE ARTHROSCOPY Bilateral   
 HX OTHER SURGICAL    
 sinus surgery  HX TONSILLECTOMY Social History Socioeconomic History  Marital status:  Spouse name: Not on file  Number of children: Not on file  Years of education: Not on file  Highest education level: Not on file Occupational History  Not on file Social Needs  Financial resource strain: Not on file  Food insecurity:  
  Worry: Not on file Inability: Not on file  Transportation needs:  
  Medical: Not on file Non-medical: Not on file Tobacco Use  Smoking status: Never Smoker  Smokeless tobacco: Never Used Substance and Sexual Activity  Alcohol use: No  
 Drug use: No  
 Sexual activity: Not Currently Lifestyle  Physical activity:  
  Days per week: Not on file Minutes per session: Not on file  Stress: Not on file Relationships  Social connections:  
  Talks on phone: Not on file Gets together: Not on file Attends Restoration service: Not on file Active member of club or organization: Not on file Attends meetings of clubs or organizations: Not on file Relationship status: Not on file  Intimate partner violence:  
  Fear of current or ex partner: Not on file Emotionally abused: Not on file Physically abused: Not on file Forced sexual activity: Not on file Other Topics Concern  Not on file Social History Narrative  Not on file Patient does not have an advanced directive on file Visit Vitals /82 Pulse 65 Temp 98.3 °F (36.8 °C) (Tympanic) Resp 16 Ht 4' 11\" (1.499 m) Wt 113 lb (51.3 kg) SpO2 97% BMI 22.82 kg/m² Physical Exam  
Skin:  Area of tick bite left inner thigh. No rash No Cervical Lymphadenopathy No Supraclavicular Lymphadenopathy Thyroid is Normal 
Lungs are normal to percussion. Clear to auscultation Heart:  S1 S2 are normal, No gallops, No murmurs No Carotid Bruits Abdomen:  Normal Bowel Sounds. No tenderness. No masses. No Hepatomegaly or Splenomegaly LE:  Strong Pedal Pulses. No Edema BMI: Okay No visits with results within 3 Month(s) from this visit. Latest known visit with results is:  
Hospital Outpatient Visit on 03/15/2018 Component Date Value Ref Range Status  Cholesterol, total 03/15/2018 202* <200 MG/DL Final  
 TSH 03/15/2018 1.94  0.36 - 3.74 uIU/mL Final  
 
 
.No results found for any visits on 05/16/19. Assessment / Plan ICD-10-CM ICD-9-CM 1. Pre-op evaluation Z01.818 V72.84   
2. Tick bite, initial encounter W57. XXXA 919.4 A974.7 3. Non-seasonal allergic rhinitis, unspecified trigger J30.89 477.8 4. Right ear pain H92.01 388.70 Augmentin for one week 
she was advised to continue her maintenance medications If the patient's per op tests are ok and the congestion resolves then the patient is ok of surgery Follow-up and Dispositions · Return in about 3 months (around 8/16/2019). I asked Lolis Mir if she has any questions and I answered the questions. Lolis Mir states that she understands the treatment plan and agrees with the treatment plan THIS DOCUMENT WAS CREATED WITH A VOICE ACTIVATED DICTATION SYSTEM.   IT MAY CONTAIN TRANSCRIPTION ERRORS

## 2019-08-07 ENCOUNTER — OFFICE VISIT (OUTPATIENT)
Dept: FAMILY MEDICINE CLINIC | Facility: CLINIC | Age: 69
End: 2019-08-07

## 2019-08-07 VITALS
WEIGHT: 124.2 LBS | HEART RATE: 98 BPM | HEIGHT: 59 IN | OXYGEN SATURATION: 99 % | BODY MASS INDEX: 25.04 KG/M2 | SYSTOLIC BLOOD PRESSURE: 122 MMHG | TEMPERATURE: 98.2 F | DIASTOLIC BLOOD PRESSURE: 76 MMHG

## 2019-08-07 DIAGNOSIS — M25.551 RIGHT HIP PAIN: Primary | ICD-10-CM

## 2019-08-07 DIAGNOSIS — J45.20 MILD INTERMITTENT REACTIVE AIRWAY DISEASE WITHOUT COMPLICATION: ICD-10-CM

## 2019-08-07 DIAGNOSIS — J02.9 SORE THROAT: ICD-10-CM

## 2019-08-07 RX ORDER — CEPHALEXIN 500 MG/1
CAPSULE ORAL
Qty: 30 CAP | Refills: 0 | Status: SHIPPED | OUTPATIENT
Start: 2019-08-07 | End: 2019-11-24 | Stop reason: ALTCHOICE

## 2019-08-07 RX ORDER — BIMATOPROST 0.1 MG/ML
SOLUTION/ DROPS OPHTHALMIC
COMMUNITY
Start: 2019-08-01

## 2019-08-07 RX ORDER — LEVOMILNACIPRAN HYDROCHLORIDE 20 MG/1
CAPSULE, EXTENDED RELEASE ORAL
Refills: 11 | COMMUNITY
Start: 2019-08-02

## 2019-08-07 NOTE — PROGRESS NOTES
Tatiana Leong presents today for   Chief Complaint   Patient presents with    Well Woman    Hip Pain     right    Knee Pain     bilateral knees    Fatigue     no energy    Sore Throat       Joann Sin preferred language for health care discussion is english. Is someone accompanying this pt? no    Is the patient using any DME equipment during [address]? no    Depression Screening:  3 most recent PHQ Screens 3/15/2018   Little interest or pleasure in doing things Not at all   Feeling down, depressed, irritable, or hopeless Not at all   Total Score PHQ 2 0       Learning Assessment:  Learning Assessment 12/7/2015   PRIMARY LEARNER Patient   HIGHEST LEVEL OF EDUCATION - PRIMARY LEARNER  2 YEARS [facility] PRIMARY LEARNER NONE   CO-LEARNER CAREGIVER No   PRIMARY LANGUAGE ENGLISH    NEED No   LEARNER PREFERENCE PRIMARY DEMONSTRATION   ANSWERED BY patient   RELATIONSHIP SELF       Abuse Screening:  Abuse Screening Questionnaire 3/15/2018   Do you ever feel afraid of your partner? N   Are you in a relationship with someone who physically or mentally threatens you? N   Is it safe for you to go home? Y       Fall Risk  Fall Risk Assessment, last 12 mths 8/7/2019   Able to walk? Yes   Fall in past 12 months? No       Health Maintenance reviewed and discussed and ordered per Provider. Health Maintenance Due   Topic Date Due    Hepatitis C Screening  1950    Shingrix Vaccine Age 50> (1 of 2) 01/12/2001    BREAST CANCER SCRN MAMMOGRAM  08/01/2010    DTaP/Tdap/Td series (1 - Tdap) 07/06/2016    MEDICARE YEARLY EXAM  03/16/2019    Influenza Age 9 to Adult  08/01/2019   . Tatiana Leong is updated on all     Pt currently taking Antiplatelet therapy? no    Coordination of Care:  1. Have you been to the ER, urgent care clinic since your last visit? no Hospitalized since your last visit? 4/2019 eye surgery, 5/2019 breast reconstruction    2.  Have you seen or consulted any other health care providers outside of the [address] [place] [address] since your last visit? no Include any pap smears or colon screening.  no

## 2019-08-11 NOTE — PROGRESS NOTES
The patient presents to the office today with the chief complaint of right hip pain    HPI    The patient remains on Singulair for hyperactive airway disease. She has had episodes of wheezing. This has been quiet on the medication. The patient complains of five days of a mild but persistent sore throat. The patient complains of right hip pain. The pain is quiet at rest.  The pain occurs when she is walking. Review of Systems   HENT: Positive for sore throat. Respiratory: Negative for shortness of breath and wheezing. Cardiovascular: Negative for chest pain and leg swelling. Musculoskeletal: Positive for joint pain. No Known Allergies    Current Outpatient Medications   Medication Sig Dispense Refill    LUMIGAN 0.01 % ophthalmic drops       FETZIMA 20 mg capsule TAKE 1 CAPSULE BY MOUTH EVERY DAY  11    cephALEXin (KEFLEX) 500 mg capsule 1 cap three times per day 30 Cap 0    fluticasone propionate (FLONASE) 50 mcg/actuation nasal spray INSTILL 2 SPRAYS INTO BOTH NOSTRILS NIGHTLY. 1 Bottle 3    montelukast (SINGULAIR) 10 mg tablet TAKE 1 TABLET BY MOUTH EVERY DAY 90 Tab 0    escitalopram oxalate (LEXAPRO) 10 mg tablet 1/4 tab po q hs per patient  12    loratadine (CLARITIN) 10 mg tablet Take 10 mg by mouth daily.  clonazePAM (KLONOPIN) 0.5 mg tablet Take 0.5 mg by mouth two (2) times a day.          Past Medical History:   Diagnosis Date    Acute bronchitis     Acute upper respiratory infection     Asthma     Cancer ([address]     Headache     Low back pain     Other ill-defined conditions(799.89)     cataracts    Psychiatric disorder     anxiety issues    Vision decreased     lens implants from cataracts       Past Surgical History:   Procedure Laterality Date    BREAST SURGERY PROCEDURE UNLISTED Bilateral 9/2008    mastectomy with reconstruction    HX BREAST RECONSTRUCTION  10/05/2017    bilateral breast implants with Dr. Tank Matos  5/20019    HX COLONOSCOPY  12/2012    neg    HX COLONOSCOPY  05/01/2017    Dr. Komal Dias, repeat in 10 years    HX HEENT Bilateral 2012    cataracts, left in June, right in July    HX HEENT  04/2019    left eye    HX KNEE ARTHROSCOPY Bilateral     HX OTHER SURGICAL      sinus surgery    HX TONSILLECTOMY         Social History     Socioeconomic History    Marital status:      Spouse name: Not on file    Number of children: Not on file    Years of education: Not on file    Highest education level: Not on file   Occupational History    Not on file   Social Needs    Financial resource strain: Not on file    Food insecurity:     Worry: Not on file     Inability: Not on file    Transportation needs:     Medical: Not on file     Non-medical: Not on file   Tobacco Use    Smoking status: Never Smoker    Smokeless tobacco: Never Used   Substance and Sexual Activity    Alcohol use: No    Drug use: No    Sexual activity: Not Currently   Lifestyle    Physical activity:     Days per week: Not on file     Minutes per session: Not on file    Stress: Not on file   Relationships    Social connections:     Talks on phone: Not on file     Gets together: Not on file     Attends Religion service: Not on file     Active member of club or organization: Not on file     Attends meetings of clubs or organizations: Not on file     Relationship status: Not on file    Intimate partner violence:     Fear of current or ex partner: Not on file     Emotionally abused: Not on file     Physically abused: Not on file     Forced sexual activity: Not on file   Other Topics Concern    Not on file   Social History Narrative    Not on file       Patient does not have an advanced directive on file    Visit Vitals  /76 (BP 1 Location: Left arm, BP Patient Position: Sitting)   Pulse 98   Temp 98.2 °F (36.8 °C) (Tympanic)   Ht 4' 11\" (1.499 m)   Wt 124 lb 3.2 oz (56.3 kg)   SpO2 99%   BMI 25.09 kg/m²       Physical Exam  No Cervical Lymphadenopathy  No Supraclavicular Lymphadenopathy  Thyroid is Normal  Lungs are normal to percussion. Clear to auscultation   Heart:  S1 S2 are normal, No gallops, No murmurs  No Carotid Bruits  Abdomen:  Normal Bowel Sounds. No tenderness. No masses. No Hepatomegaly or Splenomegaly  Right hip with full range of motion without pain  LE:  Strong Pedal Pulses. No Edema      BMI:  OK    No visits with results within 3 Month(s) from this visit. Latest known visit with results is:   Hospital Outpatient Visit on 03/15/2018   Component Date Value Ref Range Status    Cholesterol, total 03/15/2018 202* <200 MG/DL Final    TSH 03/15/2018 1.94  0.36 - 3.74 uIU/mL Final       .No results found for any visits on 08/07/19. Assessment / Plan      ICD-10-CM ICD-9-CM    1. Right hip pain M25.551 719.45 XR HIP RT W OR WO PELV 2-3 VWS   2. Sore throat J02.9 462    3. Mild intermittent reactive airway disease without complication W78.80 262.92      Xray right hip due to hip pain  Keflex for throat pain  she was advised to continue her maintenance medications      Follow-up and Dispositions    · Return in about 3 months (around 11/7/2019). I asked [facility] Chuck if she has any questions and I answered the questions. [place] states that she understands the treatment plan and agrees with the treatment plan          THIS DOCUMENT WAS CREATED WITH A VOICE ACTIVATED DICTATION SYSTEM.   IT MAY CONTAIN TRANSCRIPTION ERRORS

## 2019-08-15 ENCOUNTER — TELEPHONE (OUTPATIENT)
Dept: FAMILY MEDICINE CLINIC | Facility: CLINIC | Age: 69
End: 2019-08-15

## 2019-08-15 RX ORDER — AMOXICILLIN AND CLAVULANATE POTASSIUM 875; 125 MG/1; MG/1
TABLET, FILM COATED ORAL
Qty: 14 TAB | Refills: 0 | Status: SHIPPED | OUTPATIENT
Start: 2019-08-15 | End: 2019-11-19 | Stop reason: ALTCHOICE

## 2019-08-15 RX ORDER — PREDNISONE 20 MG/1
TABLET ORAL
Qty: 20 TAB | Refills: 0 | Status: SHIPPED | OUTPATIENT
Start: 2019-08-15 | End: 2019-11-24 | Stop reason: ALTCHOICE

## 2019-08-15 NOTE — TELEPHONE ENCOUNTER
Patient called and she is having some popping in her ears she think she has a sinus infection . She would like for Dr Asencio Seek to send her something in .  Please advise [phone]

## 2019-09-18 ENCOUNTER — HOSPITAL ENCOUNTER (OUTPATIENT)
Dept: LAB | Age: 69
Discharge: HOME | End: 2019-09-18
Payer: MEDICARE

## 2019-09-18 LAB
ANION GAP SERPL CALC-SCNC: 3 MMOL/L (ref 3–18)
BUN SERPL-MCNC: 12 MG/DL (ref 7–18)
BUN/CREAT SERPL: 18 (ref 12–20)
CALCIUM SERPL-MCNC: 9.1 MG/DL (ref 8.5–10.1)
CHLORIDE SERPL-SCNC: 107 MMOL/L (ref 100–111)
CO2 SERPL-SCNC: 31 MMOL/L (ref 21–32)
CREAT SERPL-MCNC: 0.67 MG/DL (ref 0.6–1.3)
ERYTHROCYTE [DISTWIDTH] IN BLOOD BY AUTOMATED COUNT: 13.8 % (ref 11.6–14.5)
GLUCOSE SERPL-MCNC: 84 MG/DL (ref 74–99)
HCT VFR BLD AUTO: 41.5 % (ref 35–45)
HGB BLD-MCNC: 12.9 G/DL (ref 12–16)
MCH RBC QN AUTO: 25.3 PG (ref 24–34)
MCHC RBC AUTO-ENTMCNC: 31.1 G/DL (ref 31–37)
MCV RBC AUTO: 81.5 FL (ref 74–97)
PLATELET # BLD AUTO: 264 K/UL (ref 135–420)
PMV BLD AUTO: 10.6 FL (ref 9.2–11.8)
POTASSIUM SERPL-SCNC: 4.5 MMOL/L (ref 3.5–5.5)
RBC # BLD AUTO: 5.09 M/UL (ref 4.2–5.3)
SODIUM SERPL-SCNC: 141 MMOL/L (ref 136–145)
WBC # BLD AUTO: 4.6 K/UL (ref 4.6–13.2)

## 2019-09-18 PROCEDURE — 36415 COLL VENOUS BLD VENIPUNCTURE: CPT

## 2019-09-18 PROCEDURE — 85027 COMPLETE CBC AUTOMATED: CPT

## 2019-09-18 PROCEDURE — 80048 BASIC METABOLIC PNL TOTAL CA: CPT

## 2019-09-22 RX ORDER — MONTELUKAST SODIUM 10 MG/1
TABLET ORAL
Qty: 90 TAB | Refills: 0 | Status: SHIPPED | OUTPATIENT
Start: 2019-09-22 | End: 2019-12-06 | Stop reason: SDUPTHER

## 2019-10-09 ENCOUNTER — TELEPHONE (OUTPATIENT)
Dept: FAMILY MEDICINE CLINIC | Facility: CLINIC | Age: 69
End: 2019-10-09

## 2019-10-09 ENCOUNTER — CLINICAL SUPPORT (OUTPATIENT)
Dept: FAMILY MEDICINE CLINIC | Facility: CLINIC | Age: 69
End: 2019-10-09

## 2019-10-09 DIAGNOSIS — Z23 ENCOUNTER FOR IMMUNIZATION: ICD-10-CM

## 2019-10-09 NOTE — TELEPHONE ENCOUNTER
Patient called stating she has finished with the braces. She wants to get started on the osteoporisis medication as soon as possible. Please call her at [phone]    Also have questions about shingle shot. She took the first one in Feb this year and did not get second shot. Can she still get the second shot or does she need to start over.

## 2019-10-09 NOTE — PATIENT INSTRUCTIONS
Patient presented to office for influenza adjuvanted 0.5 ml injection. Allergies noted. Patient well and consenting to injection. Injection given intramuscular in left deltoid. Patient tolerated injection well and left office ambulatory.

## 2019-10-11 RX ORDER — ALENDRONATE SODIUM 70 MG/1
70 TABLET ORAL
Qty: 12 TAB | Refills: 2 | Status: SHIPPED | OUTPATIENT
Start: 2019-10-11

## 2019-11-19 ENCOUNTER — OFFICE VISIT (OUTPATIENT)
Dept: FAMILY MEDICINE CLINIC | Facility: CLINIC | Age: 69
End: 2019-11-19

## 2019-11-19 VITALS
HEART RATE: 98 BPM | HEIGHT: 59 IN | WEIGHT: 124 LBS | BODY MASS INDEX: 25 KG/M2 | RESPIRATION RATE: 12 BRPM | SYSTOLIC BLOOD PRESSURE: 120 MMHG | DIASTOLIC BLOOD PRESSURE: 80 MMHG | OXYGEN SATURATION: 98 % | TEMPERATURE: 98.3 F

## 2019-11-19 DIAGNOSIS — J01.00 ACUTE NON-RECURRENT MAXILLARY SINUSITIS: Primary | ICD-10-CM

## 2019-11-19 DIAGNOSIS — J45.20 MILD INTERMITTENT REACTIVE AIRWAY DISEASE WITHOUT COMPLICATION: ICD-10-CM

## 2019-11-19 RX ORDER — PREDNISONE 10 MG/1
TABLET ORAL
Qty: 8 TAB | Refills: 0 | Status: SHIPPED | OUTPATIENT
Start: 2019-11-19

## 2019-11-19 RX ORDER — AMOXICILLIN 875 MG/1
875 TABLET, FILM COATED ORAL 2 TIMES DAILY
Qty: 20 TAB | Refills: 0 | Status: SHIPPED | OUTPATIENT
Start: 2019-11-19

## 2019-11-19 RX ORDER — FLUTICASONE PROPIONATE 50 MCG
SPRAY, SUSPENSION (ML) NASAL
Qty: 1 BOTTLE | Refills: 4 | Status: SHIPPED | OUTPATIENT
Start: 2019-11-19

## 2019-11-19 RX ORDER — FLUTICASONE PROPIONATE 110 UG/1
2 AEROSOL, METERED RESPIRATORY (INHALATION) EVERY 12 HOURS
Qty: 1 INHALER | Refills: 4 | Status: SHIPPED | OUTPATIENT
Start: 2019-11-19

## 2019-11-19 NOTE — PROGRESS NOTES
Jacquie Pulido presents today for   Chief Complaint   Patient presents with    Well Woman     3 mo    Cold Symptoms       Joann Sin preferred language for health care discussion is english. Is someone accompanying this pt? no    Is the patient using any DME equipment during [address]? no    Depression Screening:  3 most recent PHQ Screens 3/15/2018   Little interest or pleasure in doing things Not at all   Feeling down, depressed, irritable, or hopeless Not at all   Total Score PHQ 2 0       Learning Assessment:  Learning Assessment 12/7/2015   PRIMARY LEARNER Patient   HIGHEST LEVEL OF EDUCATION - PRIMARY LEARNER  2 YEARS Sadiq PRIMARY LEARNER NONE   CO-LEARNER CAREGIVER No   PRIMARY LANGUAGE ENGLISH    NEED No   LEARNER PREFERENCE PRIMARY DEMONSTRATION   ANSWERED BY patient   RELATIONSHIP SELF       Abuse Screening:  Abuse Screening Questionnaire 3/15/2018   Do you ever feel afraid of your partner? N   Are you in a relationship with someone who physically or mentally threatens you? N   Is it safe for you to go home? Y       Fall Risk  Fall Risk Assessment, last 12 mths 8/7/2019   Able to walk? Yes   Fall in past 12 months? No       Health Maintenance reviewed and discussed and ordered per Provider. Health Maintenance Due   Topic Date Due    Hepatitis C Screening  1950    Shingrix Vaccine Age 50> (1 of 2) 01/12/2001    BREAST CANCER SCRN MAMMOGRAM  08/01/2010    DTaP/Tdap/Td series (1 - Tdap) 07/06/2016    MEDICARE YEARLY EXAM  03/16/2019   . Jacquie Pulido is updated on all     Pt currently taking Antiplatelet therapy? no    Coordination of Care:  1. Have you been to the ER, urgent care clinic since your last visit? no Hospitalized since your last visit? no    2. Have you seen or consulted any other health care providers outside of the [address] [place] [address] since your last visit? no Include any pap smears or colon screening.  no

## 2019-11-24 NOTE — PROGRESS NOTES
The patient presents to the office today with the chief complaint of sinus congestion    HPI    The patiet complains of four days of sinus congestion with sinus drainage. The patient has reactive airway disease. She has mild wheezing. Review of Systems   Respiratory: Negative for shortness of breath and wheezing (Mild flare of bronchospasm. not wheezing). Cardiovascular: Negative for chest pain and leg swelling. No Known Allergies    Current Outpatient Medications   Medication Sig Dispense Refill    amoxicillin (AMOXIL) 875 mg tablet Take 1 Tab by mouth two (2) times a day. 20 Tab 0    predniSONE (DELTASONE) 10 mg tablet 2 tabs daily x 2 days, 1 tab daily x 2 days, 1/2 tab daily x 4 days 8 Tab 0    fluticasone propionate (FLONASE) 50 mcg/actuation nasal spray INSTILL 2 SPRAYS INTO BOTH NOSTRILS NIGHTLY. 1 Bottle 4    fluticasone propionate (FLOVENT HFA) 110 mcg/actuation inhaler Take 2 Puffs by inhalation every twelve (12) hours. 1 Inhaler 4    alendronate (FOSAMAX) 70 mg tablet Take 1 Tab by mouth every seven (7) days. 12 Tab 2    montelukast (SINGULAIR) 10 mg tablet TAKE 1 TABLET BY MOUTH EVERY DAY 90 Tab 0    LUMIGAN 0.01 % ophthalmic drops       FETZIMA 20 mg capsule TAKE 1 CAPSULE BY MOUTH EVERY DAY  11    escitalopram oxalate (LEXAPRO) 10 mg tablet 1/4 tab po q hs per patient  12    loratadine (CLARITIN) 10 mg tablet Take 10 mg by mouth daily.          Past Medical History:   Diagnosis Date    Acute bronchitis     Acute upper respiratory infection     Asthma     Cancer ([facility] [address]     Headache     Low back pain     Other ill-defined conditions(799.89)     cataracts    Psychiatric disorder     anxiety issues    Vision decreased     lens implants from cataracts       Past Surgical History:   Procedure Laterality Date    BREAST SURGERY PROCEDURE UNLISTED Bilateral 9/2008    mastectomy with reconstruction    HX BREAST RECONSTRUCTION  10/05/2017    bilateral breast implants with Dr. Montaño Learn  5/20019    HX COLONOSCOPY  12/2012    neg    HX COLONOSCOPY  05/01/2017    Dr. Janis Montgomery, repeat in 10 years    HX HEENT Bilateral 2012    cataracts, left in June, right in July    HX HEENT  04/2019    left eye    HX KNEE ARTHROSCOPY Bilateral     HX OTHER SURGICAL      sinus surgery    HX TONSILLECTOMY         Social History     Socioeconomic History    Marital status:      Spouse name: Not on file    Number of children: Not on file    Years of education: Not on file    Highest education level: Not on file   Occupational History    Not on file   Social Needs    Financial resource strain: Not on file    Food insecurity:     Worry: Not on file     Inability: Not on file    Transportation needs:     Medical: Not on file     Non-medical: Not on file   Tobacco Use    Smoking status: Never Smoker    Smokeless tobacco: Never Used   Substance and Sexual Activity    Alcohol use: No    Drug use: No    Sexual activity: Not Currently   Lifestyle    Physical activity:     Days per week: Not on file     Minutes per session: Not on file    Stress: Not on file   Relationships    Social connections:     Talks on phone: Not on file     Gets together: Not on file     Attends Sikh service: Not on file     Active member of club or organization: Not on file     Attends meetings of clubs or organizations: Not on file     Relationship status: Not on file    Intimate partner violence:     Fear of current or ex partner: Not on file     Emotionally abused: Not on file     Physically abused: Not on file     Forced sexual activity: Not on file   Other Topics Concern    Not on file   Social History Narrative    Not on file       Patient does not have an advanced directive on file    Visit Vitals  /80 (BP 1 Location: Left arm, BP Patient Position: Sitting)   Pulse 98   Temp 98.3 °F (36.8 °C) (Tympanic)   Resp 12   Ht 4' 11\" (1.499 m)   Wt 124 lb (56.2 kg)   SpO2 98%   BMI 25.04 kg/m²       Physical Exam  HENT:      Right Ear: Tympanic membrane normal.      Left Ear: Tympanic membrane normal.   Neck:      Vascular: No carotid bruit. Cardiovascular:      Heart sounds: No murmur. No gallop. Pulmonary:      Effort: Pulmonary effort is normal.      Breath sounds: No wheezing (Prolonged expiratoyr phase), rhonchi or rales. BMI:  [facility] Visit on 09/18/2019   Component Date Value Ref Range Status    WBC 09/18/2019 4.6  4.6 - 13.2 K/uL Final    RBC 09/18/2019 5.09  4. 20 - 5.30 M/uL Final    HGB 09/18/2019 12.9  12.0 - 16.0 g/dL Final    HCT 09/18/2019 41.5  35.0 - 45.0 % Final    MCV 09/18/2019 81.5  74.0 - 97.0 FL Final    MCH 09/18/2019 25.3  24.0 - 34.0 PG Final    MCHC 09/18/2019 31.1  31.0 - 37.0 g/dL Final    RDW 09/18/2019 13.8  11.6 - 14.5 % Final    PLATELET 06/87/4433 953  135 - 420 K/uL Final    MPV 09/18/2019 10.6  9.2 - 11.8 FL Final    Sodium 09/18/2019 141  136 - 145 mmol/L Final    Potassium 09/18/2019 4.5  3.5 - 5.5 mmol/L Final    Chloride 09/18/2019 107  100 - 111 mmol/L Final    CO2 09/18/2019 31  21 - 32 mmol/L Final    Anion gap 09/18/2019 3  3.0 - 18 mmol/L Final    Glucose 09/18/2019 84  74 - 99 mg/dL Final    BUN 09/18/2019 12  7.0 - 18 MG/DL Final    Creatinine 09/18/2019 0.67  0.6 - 1.3 MG/DL Final    BUN/Creatinine ratio 09/18/2019 18  12 - 20   Final    GFR est AA 09/18/2019 >60  >60 ml/min/1.73m2 Final    GFR est non-AA 09/18/2019 >60  >60 ml/min/1.73m2 Final    Comment: (NOTE)  Estimated GFR is calculated using the Modification of Diet in Renal   Disease (MDRD) Study equation, reported for both  Americans   (GFRAA) and non- Americans (GFRNA), and normalized to 1.73m2   body surface area. The physician must decide which value applies to   the patient. The MDRD study equation should only be used in   individuals age 25 or older.  It has not been validated for the   following: pregnant women, patients with serious comorbid conditions,   or on certain medications, or persons with extremes of body size,   muscle mass, or nutritional status.  Calcium 09/18/2019 9.1  8.5 - 10.1 MG/DL Final       .No results found for any visits on 11/19/19. Assessment / Plan    Amoxicillin  Prednisone  she was advised to continue her maintenance medications      Follow-up and Dispositions    · Return in about 6 months (around 5/19/2020). I asked Heather Queen if she has any questions and I answered the questions. Heather Queen states that she understands the treatment plan and agrees with the treatment plan          THIS DOCUMENT WAS CREATED WITH A VOICE ACTIVATED DICTATION SYSTEM.   IT MAY CONTAIN TRANSCRIPTION ERRORS

## 2019-12-06 RX ORDER — MONTELUKAST SODIUM 10 MG/1
TABLET ORAL
Qty: 90 TAB | Refills: 0 | Status: SHIPPED | OUTPATIENT
Start: 2019-12-06 | End: 2019-12-19 | Stop reason: SDUPTHER

## 2019-12-15 RX ORDER — FLUTICASONE PROPIONATE 50 MCG
SPRAY, SUSPENSION (ML) NASAL
Qty: 1 BOTTLE | Refills: 3 | Status: SHIPPED | OUTPATIENT
Start: 2019-12-15

## 2019-12-19 RX ORDER — MONTELUKAST SODIUM 10 MG/1
TABLET ORAL
Qty: 90 TAB | Refills: 1 | Status: SHIPPED | OUTPATIENT
Start: 2019-12-19

## 2019-12-23 ENCOUNTER — TELEPHONE (OUTPATIENT)
Dept: FAMILY MEDICINE CLINIC | Facility: CLINIC | Age: 69
End: 2019-12-23

## 2019-12-23 NOTE — TELEPHONE ENCOUNTER
Patient states she has sneezing, using Flonase daily. She is now blowing out greenish. Feels like it may turn in to something else and request a prescription to take with her for antibiotic. Please advise.

## 2019-12-24 RX ORDER — AZITHROMYCIN 250 MG/1
TABLET, FILM COATED ORAL
Qty: 6 TAB | Refills: 1 | Status: SHIPPED | OUTPATIENT
Start: 2019-12-24 | End: 2019-12-29

## 2021-07-21 NOTE — PROGRESS NOTES
Chief Complaint Patient presents with  Pre-op Exam  
  left eye surgery  Incontinence Ftsg Text: The defect edges were debeveled with a #15 scalpel blade.  Given the location of the defect, shape of the defect and the proximity to free margins a full thickness skin graft was deemed most appropriate.  Using a sterile surgical marker, the primary defect shape was transferred to the donor site. The area thus outlined was incised deep to adipose tissue with a #15 scalpel blade.  The harvested graft was then trimmed of adipose tissue until only dermis and epidermis was left.  The skin margins of the secondary defect were undermined to an appropriate distance in all directions utilizing iris scissors.  The secondary defect was closed with interrupted buried subcutaneous sutures.  The skin edges were then re-apposed with running  sutures.  The skin graft was then placed in the primary defect and oriented appropriately.